# Patient Record
Sex: MALE | Race: WHITE | NOT HISPANIC OR LATINO | URBAN - METROPOLITAN AREA
[De-identification: names, ages, dates, MRNs, and addresses within clinical notes are randomized per-mention and may not be internally consistent; named-entity substitution may affect disease eponyms.]

---

## 2018-09-06 ENCOUNTER — OUTPATIENT (OUTPATIENT)
Dept: OUTPATIENT SERVICES | Facility: HOSPITAL | Age: 73
LOS: 1 days | Discharge: HOME | End: 2018-09-06

## 2020-09-29 PROBLEM — Z00.00 ENCOUNTER FOR PREVENTIVE HEALTH EXAMINATION: Status: ACTIVE | Noted: 2020-09-29

## 2020-10-06 ENCOUNTER — APPOINTMENT (OUTPATIENT)
Dept: ORTHOPEDIC SURGERY | Facility: CLINIC | Age: 75
End: 2020-10-06
Payer: MEDICARE

## 2020-10-06 VITALS
HEIGHT: 66 IN | BODY MASS INDEX: 24.27 KG/M2 | DIASTOLIC BLOOD PRESSURE: 92 MMHG | SYSTOLIC BLOOD PRESSURE: 162 MMHG | HEART RATE: 80 BPM | WEIGHT: 151 LBS

## 2020-10-06 DIAGNOSIS — Z78.9 OTHER SPECIFIED HEALTH STATUS: ICD-10-CM

## 2020-10-06 DIAGNOSIS — M67.911 UNSPECIFIED DISORDER OF SYNOVIUM AND TENDON, RIGHT SHOULDER: ICD-10-CM

## 2020-10-06 DIAGNOSIS — M25.561 PAIN IN RIGHT KNEE: ICD-10-CM

## 2020-10-06 PROCEDURE — 73030 X-RAY EXAM OF SHOULDER: CPT | Mod: RT

## 2020-10-06 PROCEDURE — 99204 OFFICE O/P NEW MOD 45 MIN: CPT

## 2020-10-06 PROCEDURE — 73564 X-RAY EXAM KNEE 4 OR MORE: CPT | Mod: 50

## 2020-10-06 NOTE — CONSULT LETTER
[Dear  ___] : Dear  [unfilled], [Consult Letter:] : I had the pleasure of evaluating your patient, [unfilled]. [Please see my note below.] : Please see my note below. [Consult Closing:] : Thank you very much for allowing me to participate in the care of this patient.  If you have any questions, please do not hesitate to contact me. [Sincerely,] : Sincerely, [FreeTextEntry2] : Cecil Mendez MD [FreeTextEntry3] : Naa Lora MD\par Orthopedic Surgery\par Sports Medicine\par

## 2020-10-06 NOTE — PHYSICAL EXAM
[LE] : Sensory: Intact in bilateral lower extremities [DP] : dorsalis pedis 2+ and symmetric bilaterally [PT] : posterior tibial 2+ and symmetric bilaterally [Normal RLE] : Right Lower Extremity: No scars, rashes, lesions, ulcers, skin intact [Normal LLE] : Left Lower Extremity: No scars, rashes, lesions, ulcers, skin intact [Normal Touch] : sensation intact for touch [Normal] : Gait: normal [de-identified] : RIGHT shoulder with LEFT for comparison\par Cervical spine is With mild stiffness but no pain\par Normal appearance. No edema, ecchymoses, erythema, deformity\par AROM: 170 FE, IR to T 9 equal to the lEFT but with mild pain on the RIGHT, 160-170 abd with mild pain on the RIGHT shoulder\par PROM: 170 FE, 70 ER at the side, 90 ER and  IR in the 90 degree abducted position.\par Motor:  5/5  supraspinatus,  5/5 ER, 5/5 IR, 5/5 biceps, 5/5 deltoid.  Normal lift off test\par RIGHT shoulder is with mildly positive Neer and Perales. -  X-arm.  \par nontender a.c. joint and nontender over the biceps tendon and the anterior shoulder.  \par Laxity: normal. \par No scapular winging.\par Sensation is intact distally in the UE.\par Skin is intact in the UE. \par Intact Motor distally.\par \par Knees:\par Nonantalgic gait. \par Normal alignment\par no effusion.\par - erythema, edema, warmth.\par Tender mildly patellar facets RIGHT knee but not LEFT knee.\par ROM: 0 degrees extension to 130 degrees flexion with mild pain RIGHT knee on full flexion but no pain on the LEFT. No crepitus\par - Larry.\par 1A Lachman.  - Pivot shift. - posterior drawer. Normal rotational, varus/valgus laxity.\par Intact extensor mechanism.\par NVI distally.\par  [de-identified] : No respiratory distress or cough [de-identified] : \par x-rays of bilateral knees WB 4 views today show mild lateral joint space narrowing on the flexed views but otherwise is in no significant joint space narrowing, osteophytes or other signs of osteoarthritis.\par \par X-rays of the RIGHT shoulder show narrowing at the RIGHT a.c.. No elevation of the humeral head or other changes

## 2020-10-06 NOTE — ASSESSMENT
[FreeTextEntry1] : 74-year-old Gentleman with mild RIGHT greater than LEFT knee pain and mild RIGHT shoulder pain and stiffness. 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 In the RIGHT shoulder there is some a.c. joint arthrosis and likely rotator cuff tendinopathy but he has very good strength in the shoulder.\par I gave him home exercises to do for the knees and for his shoulder. Heat and ice. Modify activity to prevent pain If pain is persisting he should temporarily stop walking long distances to allow it to settle down. He could try Voltaren gel or  take some ibuprofen or Tylenol.\par If pain is persisting he should come in for further evaluation or we may try an injection.

## 2020-10-06 NOTE — HISTORY OF PRESENT ILLNESS
[de-identified] : Mr. Mott is a 75 yo RHD gentleman who presents for Right > Left knee pain and mild intermittent RIGHT shoulder pain.\par He retired 4  yrs ago and walks 2-8 miles/ day.\par He walked in the snow 8-9 miles in Jan and knees were fine. \par After he had some thigh pain.\par In March- August he walked his usual 2-8 miles.\par One day in Aug he started having pain and limping intermittently usually after sitting and not when doing his walks. Occasionally when walking he'll feel a little bit of anterior knee pain more on the RIGHT than LEFT\par In the past mo he has soreness  left > right knee.\par He gets intermittent shooting pains in various locations usually at night.\par Knee pain is 2/10 at the worse right and 0.2/10 right.\par No swelling, locking or buckling.\par \par His right shoulder is stiff and painful reaching overhead for 3-4 mos.\par He had injured a RC probably 3-4 yrs ago which got better with PT and time. Pain in the shoulder only 1/10 with movement.\par He never takes anything for pain.

## 2022-03-14 ENCOUNTER — EMERGENCY (EMERGENCY)
Facility: HOSPITAL | Age: 77
LOS: 1 days | Discharge: HOME | End: 2022-03-14
Attending: EMERGENCY MEDICINE | Admitting: EMERGENCY MEDICINE
Payer: MEDICARE

## 2022-03-14 VITALS
TEMPERATURE: 98 F | SYSTOLIC BLOOD PRESSURE: 146 MMHG | RESPIRATION RATE: 18 BRPM | DIASTOLIC BLOOD PRESSURE: 78 MMHG | OXYGEN SATURATION: 96 % | HEART RATE: 89 BPM

## 2022-03-14 VITALS
OXYGEN SATURATION: 96 % | TEMPERATURE: 98 F | DIASTOLIC BLOOD PRESSURE: 95 MMHG | SYSTOLIC BLOOD PRESSURE: 165 MMHG | HEART RATE: 108 BPM | RESPIRATION RATE: 18 BRPM

## 2022-03-14 DIAGNOSIS — Z98.890 OTHER SPECIFIED POSTPROCEDURAL STATES: Chronic | ICD-10-CM

## 2022-03-14 DIAGNOSIS — R33.8 OTHER RETENTION OF URINE: ICD-10-CM

## 2022-03-14 DIAGNOSIS — N40.1 BENIGN PROSTATIC HYPERPLASIA WITH LOWER URINARY TRACT SYMPTOMS: ICD-10-CM

## 2022-03-14 LAB
ALBUMIN SERPL ELPH-MCNC: 3.8 G/DL — SIGNIFICANT CHANGE UP (ref 3.3–5)
ALP SERPL-CCNC: 82 U/L — SIGNIFICANT CHANGE UP (ref 40–120)
ALT FLD-CCNC: 17 U/L — SIGNIFICANT CHANGE UP (ref 10–45)
ANION GAP SERPL CALC-SCNC: 11 MMOL/L — SIGNIFICANT CHANGE UP (ref 5–17)
APPEARANCE UR: CLEAR — SIGNIFICANT CHANGE UP
APTT BLD: 28.1 SEC — SIGNIFICANT CHANGE UP (ref 27.5–35.5)
AST SERPL-CCNC: 22 U/L — SIGNIFICANT CHANGE UP (ref 10–40)
BACTERIA # UR AUTO: PRESENT /HPF
BASOPHILS # BLD AUTO: 0.02 K/UL — SIGNIFICANT CHANGE UP (ref 0–0.2)
BASOPHILS NFR BLD AUTO: 0.2 % — SIGNIFICANT CHANGE UP (ref 0–2)
BILIRUB SERPL-MCNC: 0.5 MG/DL — SIGNIFICANT CHANGE UP (ref 0.2–1.2)
BILIRUB UR-MCNC: NEGATIVE — SIGNIFICANT CHANGE UP
BUN SERPL-MCNC: 10 MG/DL — SIGNIFICANT CHANGE UP (ref 7–23)
CALCIUM SERPL-MCNC: 9 MG/DL — SIGNIFICANT CHANGE UP (ref 8.4–10.5)
CHLORIDE SERPL-SCNC: 101 MMOL/L — SIGNIFICANT CHANGE UP (ref 96–108)
CO2 SERPL-SCNC: 27 MMOL/L — SIGNIFICANT CHANGE UP (ref 22–31)
COLOR SPEC: YELLOW — SIGNIFICANT CHANGE UP
CREAT SERPL-MCNC: 1.06 MG/DL — SIGNIFICANT CHANGE UP (ref 0.5–1.3)
DIFF PNL FLD: ABNORMAL
EGFR: 73 ML/MIN/1.73M2 — SIGNIFICANT CHANGE UP
EOSINOPHIL # BLD AUTO: 0.02 K/UL — SIGNIFICANT CHANGE UP (ref 0–0.5)
EOSINOPHIL NFR BLD AUTO: 0.2 % — SIGNIFICANT CHANGE UP (ref 0–6)
EPI CELLS # UR: SIGNIFICANT CHANGE UP /HPF (ref 0–5)
GLUCOSE SERPL-MCNC: 127 MG/DL — HIGH (ref 70–99)
GLUCOSE UR QL: NEGATIVE — SIGNIFICANT CHANGE UP
HCT VFR BLD CALC: 39.1 % — SIGNIFICANT CHANGE UP (ref 39–50)
HGB BLD-MCNC: 13.1 G/DL — SIGNIFICANT CHANGE UP (ref 13–17)
IMM GRANULOCYTES NFR BLD AUTO: 0.3 % — SIGNIFICANT CHANGE UP (ref 0–1.5)
INR BLD: 1.18 — HIGH (ref 0.88–1.16)
KETONES UR-MCNC: NEGATIVE — SIGNIFICANT CHANGE UP
LEUKOCYTE ESTERASE UR-ACNC: ABNORMAL
LYMPHOCYTES # BLD AUTO: 0.71 K/UL — LOW (ref 1–3.3)
LYMPHOCYTES # BLD AUTO: 7.5 % — LOW (ref 13–44)
MCHC RBC-ENTMCNC: 30 PG — SIGNIFICANT CHANGE UP (ref 27–34)
MCHC RBC-ENTMCNC: 33.5 GM/DL — SIGNIFICANT CHANGE UP (ref 32–36)
MCV RBC AUTO: 89.5 FL — SIGNIFICANT CHANGE UP (ref 80–100)
MONOCYTES # BLD AUTO: 0.68 K/UL — SIGNIFICANT CHANGE UP (ref 0–0.9)
MONOCYTES NFR BLD AUTO: 7.2 % — SIGNIFICANT CHANGE UP (ref 2–14)
NEUTROPHILS # BLD AUTO: 7.98 K/UL — HIGH (ref 1.8–7.4)
NEUTROPHILS NFR BLD AUTO: 84.6 % — HIGH (ref 43–77)
NITRITE UR-MCNC: POSITIVE
NRBC # BLD: 0 /100 WBCS — SIGNIFICANT CHANGE UP (ref 0–0)
PH UR: 5.5 — SIGNIFICANT CHANGE UP (ref 5–8)
PLATELET # BLD AUTO: 227 K/UL — SIGNIFICANT CHANGE UP (ref 150–400)
POTASSIUM SERPL-MCNC: 4.4 MMOL/L — SIGNIFICANT CHANGE UP (ref 3.5–5.3)
POTASSIUM SERPL-SCNC: 4.4 MMOL/L — SIGNIFICANT CHANGE UP (ref 3.5–5.3)
PROT SERPL-MCNC: 6.6 G/DL — SIGNIFICANT CHANGE UP (ref 6–8.3)
PROT UR-MCNC: NEGATIVE MG/DL — SIGNIFICANT CHANGE UP
PROTHROM AB SERPL-ACNC: 14.1 SEC — HIGH (ref 10.5–13.4)
RBC # BLD: 4.37 M/UL — SIGNIFICANT CHANGE UP (ref 4.2–5.8)
RBC # FLD: 12.5 % — SIGNIFICANT CHANGE UP (ref 10.3–14.5)
RBC CASTS # UR COMP ASSIST: < 5 /HPF — SIGNIFICANT CHANGE UP
SODIUM SERPL-SCNC: 139 MMOL/L — SIGNIFICANT CHANGE UP (ref 135–145)
SP GR SPEC: 1.01 — SIGNIFICANT CHANGE UP (ref 1–1.03)
UROBILINOGEN FLD QL: 0.2 E.U./DL — SIGNIFICANT CHANGE UP
WBC # BLD: 9.44 K/UL — SIGNIFICANT CHANGE UP (ref 3.8–10.5)
WBC # FLD AUTO: 9.44 K/UL — SIGNIFICANT CHANGE UP (ref 3.8–10.5)
WBC UR QL: ABNORMAL /HPF

## 2022-03-14 PROCEDURE — 87186 SC STD MICRODIL/AGAR DIL: CPT

## 2022-03-14 PROCEDURE — 87184 SC STD DISK METHOD PER PLATE: CPT

## 2022-03-14 PROCEDURE — 85610 PROTHROMBIN TIME: CPT

## 2022-03-14 PROCEDURE — 81001 URINALYSIS AUTO W/SCOPE: CPT

## 2022-03-14 PROCEDURE — 85730 THROMBOPLASTIN TIME PARTIAL: CPT

## 2022-03-14 PROCEDURE — 99283 EMERGENCY DEPT VISIT LOW MDM: CPT

## 2022-03-14 PROCEDURE — 36415 COLL VENOUS BLD VENIPUNCTURE: CPT

## 2022-03-14 PROCEDURE — 36000 PLACE NEEDLE IN VEIN: CPT

## 2022-03-14 PROCEDURE — 51702 INSERT TEMP BLADDER CATH: CPT

## 2022-03-14 PROCEDURE — 99284 EMERGENCY DEPT VISIT MOD MDM: CPT

## 2022-03-14 PROCEDURE — 80053 COMPREHEN METABOLIC PANEL: CPT

## 2022-03-14 PROCEDURE — 85025 COMPLETE CBC W/AUTO DIFF WBC: CPT

## 2022-03-14 PROCEDURE — 87086 URINE CULTURE/COLONY COUNT: CPT

## 2022-03-14 RX ORDER — SODIUM CHLORIDE 9 MG/ML
1000 INJECTION INTRAMUSCULAR; INTRAVENOUS; SUBCUTANEOUS ONCE
Refills: 0 | Status: COMPLETED | OUTPATIENT
Start: 2022-03-14 | End: 2022-03-14

## 2022-03-14 RX ORDER — CEFPODOXIME PROXETIL 100 MG
200 TABLET ORAL ONCE
Refills: 0 | Status: COMPLETED | OUTPATIENT
Start: 2022-03-14 | End: 2022-03-14

## 2022-03-14 RX ADMIN — SODIUM CHLORIDE 1000 MILLILITER(S): 9 INJECTION INTRAMUSCULAR; INTRAVENOUS; SUBCUTANEOUS at 17:32

## 2022-03-14 RX ADMIN — Medication 200 MILLIGRAM(S): at 19:15

## 2022-03-14 NOTE — ED PROVIDER NOTE - NSFOLLOWUPINSTRUCTIONS_ED_ALL_ED_FT
follow up with your Urologist in 3 days.       Urinary Retention in Men    WHAT YOU NEED TO KNOW:    What is urinary retention? Urinary retention is a condition that develops when your bladder does not empty completely when you urinate.    Male Reproductive System         What causes urinary retention?   •An enlarged prostate      •Blockages, such as a stone, growth, or narrowing of your urethra      •A weak bladder muscle      •Nerve damage from diabetes, stroke, or spinal cord injury      •Bladder diverticula, which are pockets of urine that form in your bladder and do not empty      •Certain medicines, such as narcotics, antihistamines, or antidepressants      What are the signs and symptoms of urinary retention?   •Frequent urination, or the urge to urinate right after you finish      •An urge to urinate, but your urine does not come out or dribbles out slowly and weakly      •Frequent urine leaks that happen during the day or while you sleep      •Pain or pressure when you urinate      •Pain or stiffness in your abdomen, lower back, hips, or upper thighs      •Blood in your urine      How is urinary retention diagnosed? Your healthcare provider will ask about your health history and the medicines you take. He will press or tap on your lower abdomen. You may need any of the following tests:   •A digital rectal exam is when healthcare providers carefully feel the size of your prostate.      •A post void residual test will show how much urine is left in your bladder after you urinate. You will be asked to urinate and then healthcare providers will use a small ultrasound machine to check how much urine is left in your bladder.      •Blood or urine tests may show infection or prostate specific antigen (PSA) levels. PSA may be elevated in prostate cancer.      •An ultrasound uses sound waves to show pictures on a monitor. An ultrasound may be done to show bladder stones, infection, or other problems.      •A CT scan, or CAT scan, is a type of x-ray that is taken of your prostate, kidneys, and bladder. The pictures may show what is causing your urinary retention. You may be given a dye before the pictures are taken to help healthcare providers see the pictures better. Tell the healthcare provider if you have ever had an allergic reaction to contrast dye.      How is urinary retention treated?   •A Zimmer catheter is a tube put into your bladder to drain urine into a bag. Keep the bag below your waist. This will prevent urine from flowing back into your bladder and causing an infection or other problems. Also, keep the tube free of kinks so the urine will drain properly. Do not pull on the catheter. This can cause pain and bleeding, and may cause the catheter to come out.       •Medicines can help decrease the size of your prostate, fight infection, and help you urinate more easily.      •Surgery may be needed to treat the condition that is causing your urinary retention.       When should I contact my healthcare provider?   •You have a fever.      •You have pain when you urinate.      •You have blood in your urine.      •You have problems with your catheter.      •You have questions or concerns about your condition or care.      When should I seek immediate care or call 911?   •You have severe abdominal pain.      •You are breathing faster than usual.      •Your heartbeat is faster than usual.      •Your face, hands, feet, or ankles are swollen.       CARE AGREEMENT:    You have the right to help plan your care. Learn about your health condition and how it may be treated. Discuss treatment options with your healthcare providers to decide what care you want to receive. You always have the right to refuse treatment.

## 2022-03-14 NOTE — ED PROVIDER NOTE - OBJECTIVE STATEMENT
76y Male PMH of cervical spinal stenosis s/p spine surgery on Thursday p/w acute urinary retention that began last night. He denies bowel incontinence, numbness, weakness, tingling or sensory changes in any of the extremities.

## 2022-03-14 NOTE — ED ADULT NURSE NOTE - OBJECTIVE STATEMENT
76yr/male presents to ED with c/o urinary retention. Patient reports last time he urinated was at around 8pm last night and states "it wasn't much." Patient reports hx of spinal surgery last Thursday. Denies fever. Patient in C-collar on arrival.

## 2022-03-14 NOTE — ED PROVIDER NOTE - PATIENT PORTAL LINK FT
You can access the FollowMyHealth Patient Portal offered by [facility] by registering at the following website: [email or web address]. By joining [employer]’s FollowMyHealth portal, you will also be able to view your health information using other applications (apps) compatible with our system.

## 2022-03-14 NOTE — ED PROVIDER NOTE - CLINICAL SUMMARY MEDICAL DECISION MAKING FREE TEXT BOX
76y Male PMH of cervical spinal stenosis s/p spine surgery on Thursday p/w acute urinary retention. Zimmer placed patient put out 1L< of clear, light-yellow urine. Acute urinary retention most likely 2/2 76y Male PMH of cervical spinal stenosis s/p spine surgery on Thursday p/w acute urinary retention. Zimmer placed patient put out 1L< of clear, light-yellow urine. Acute urinary retention most likely 2/2 tramadol. Patient instructed to f/u with PCP and surgeon. 76y Male PMH of cervical spinal stenosis s/p spine surgery on Thursday p/w acute urinary retention. Almazan placed patient put out 1L< of clear, light-yellow urine. Acute urinary retention most likely 2/2 tramadol. Patient instructed to f/u with PCP and urologist for almazan removal.

## 2022-03-14 NOTE — ED ADULT NURSE REASSESSMENT NOTE - NS ED NURSE REASSESS COMMENT FT1
leg bag placed on patient. extensive education completed with patient and wife on leg bag care. demonstrated and verbalized understanding. ambulatory w walker independently to bathroom.

## 2022-03-14 NOTE — ED PROVIDER NOTE - CONSTITUTIONAL, MLM
Lying in bed wearing cervical color, well appearing, awake, alert, oriented to person, place, time/situation and in no apparent distress. normal...

## 2022-03-14 NOTE — ED PROVIDER NOTE - ATTENDING CONTRIBUTION TO CARE
+ retention after recent surgery, relieved 1 L w almazan ,   BPH / recent constipation and possibly med effect from tramadol / recent anesthesia contributing,   L ok , UA possible UTI , will give antibiotics, stable for d/c   urology f/u   discussed emergent return instructions

## 2022-03-15 RX ORDER — AZTREONAM 2 G
1 VIAL (EA) INJECTION
Qty: 14 | Refills: 0
Start: 2022-03-15 | End: 2022-03-21

## 2022-03-16 ENCOUNTER — TRANSCRIPTION ENCOUNTER (OUTPATIENT)
Age: 77
End: 2022-03-16

## 2022-03-16 ENCOUNTER — NON-APPOINTMENT (OUTPATIENT)
Age: 77
End: 2022-03-16

## 2022-03-16 DIAGNOSIS — N39.0 URINARY TRACT INFECTION, SITE NOT SPECIFIED: ICD-10-CM

## 2022-03-16 DIAGNOSIS — R33.9 RETENTION OF URINE, UNSPECIFIED: ICD-10-CM

## 2022-03-16 LAB
-  AZTREONAM: SIGNIFICANT CHANGE UP
-  CEFEPIME: SIGNIFICANT CHANGE UP
-  CIPROFLOXACIN: SIGNIFICANT CHANGE UP
-  CIPROFLOXACIN: SIGNIFICANT CHANGE UP
-  GENTAMICIN: SIGNIFICANT CHANGE UP
-  PIPERACILLIN/TAZOBACTAM: SIGNIFICANT CHANGE UP
-  TOBRAMYCIN: SIGNIFICANT CHANGE UP
CULTURE RESULTS: SIGNIFICANT CHANGE UP
METHOD TYPE: SIGNIFICANT CHANGE UP
METHOD TYPE: SIGNIFICANT CHANGE UP
ORGANISM # SPEC MICROSCOPIC CNT: SIGNIFICANT CHANGE UP
SPECIMEN SOURCE: SIGNIFICANT CHANGE UP

## 2022-03-17 ENCOUNTER — APPOINTMENT (OUTPATIENT)
Dept: UROLOGY | Facility: CLINIC | Age: 77
End: 2022-03-17
Payer: MEDICARE

## 2022-03-17 ENCOUNTER — NON-APPOINTMENT (OUTPATIENT)
Age: 77
End: 2022-03-17

## 2022-03-17 VITALS
DIASTOLIC BLOOD PRESSURE: 82 MMHG | BODY MASS INDEX: 24.43 KG/M2 | HEART RATE: 98 BPM | HEIGHT: 66 IN | SYSTOLIC BLOOD PRESSURE: 138 MMHG | WEIGHT: 152 LBS | TEMPERATURE: 97.9 F

## 2022-03-17 DIAGNOSIS — Z82.49 FAMILY HISTORY OF ISCHEMIC HEART DISEASE AND OTHER DISEASES OF THE CIRCULATORY SYSTEM: ICD-10-CM

## 2022-03-17 DIAGNOSIS — E78.5 HYPERLIPIDEMIA, UNSPECIFIED: ICD-10-CM

## 2022-03-17 DIAGNOSIS — M54.50 LOW BACK PAIN, UNSPECIFIED: ICD-10-CM

## 2022-03-17 DIAGNOSIS — G89.29 LOW BACK PAIN, UNSPECIFIED: ICD-10-CM

## 2022-03-17 DIAGNOSIS — Z83.79 FAMILY HISTORY OF OTHER DISEASES OF THE DIGESTIVE SYSTEM: ICD-10-CM

## 2022-03-17 LAB
BILIRUB UR QL STRIP: NORMAL
CLARITY UR: CLEAR
GLUCOSE UR-MCNC: NORMAL
HCG UR QL: 1 EU/DL
HGB UR QL STRIP.AUTO: NORMAL
KETONES UR-MCNC: NORMAL
LEUKOCYTE ESTERASE UR QL STRIP: NORMAL
NITRITE UR QL STRIP: NORMAL
PH UR STRIP: 7
PROT UR STRIP-MCNC: 100
SP GR UR STRIP: 1.02

## 2022-03-17 PROCEDURE — 76857 US EXAM PELVIC LIMITED: CPT

## 2022-03-17 PROCEDURE — 81003 URINALYSIS AUTO W/O SCOPE: CPT | Mod: QW

## 2022-03-17 PROCEDURE — 54450 PREPUTIAL STRETCHING: CPT

## 2022-03-17 PROCEDURE — 99205 OFFICE O/P NEW HI 60 MIN: CPT

## 2022-03-17 RX ORDER — SENNOSIDES 8.6 MG TABLETS 8.6 MG/1
8.6 TABLET ORAL
Refills: 0 | Status: ACTIVE | COMMUNITY

## 2022-03-17 RX ORDER — ROSUVASTATIN CALCIUM 5 MG/1
TABLET, FILM COATED ORAL
Refills: 0 | Status: ACTIVE | COMMUNITY

## 2022-03-17 RX ORDER — TRAMADOL HYDROCHLORIDE 25 MG/1
TABLET, COATED ORAL
Refills: 0 | Status: ACTIVE | COMMUNITY

## 2022-03-18 PROBLEM — M48.00 SPINAL STENOSIS, SITE UNSPECIFIED: Chronic | Status: ACTIVE | Noted: 2022-03-14

## 2022-03-20 LAB — BACTERIA UR CULT: NORMAL

## 2022-03-21 NOTE — PHYSICAL EXAM
[General Appearance - Well Developed] : well developed [General Appearance - Well Nourished] : well nourished [Well Groomed] : well groomed [Normal Appearance] : normal appearance [General Appearance - In No Acute Distress] : no acute distress [Edema] : no peripheral edema [Respiration, Rhythm And Depth] : normal respiratory rhythm and effort [Exaggerated Use Of Accessory Muscles For Inspiration] : no accessory muscle use [Abdomen Soft] : soft [Abdomen Tenderness] : non-tender [Costovertebral Angle Tenderness] : no ~M costovertebral angle tenderness [Urethral Meatus] : meatus normal [Urinary Bladder Findings] : the bladder was normal on palpation [Scrotum] : the scrotum was normal [Testes Mass (___cm)] : there were no testicular masses [No Prostate Nodules] : no prostate nodules [Normal Station and Gait] : the gait and station were normal for the patient's age [] : no rash [No Focal Deficits] : no focal deficits [Oriented To Time, Place, And Person] : oriented to person, place, and time [Affect] : the affect was normal [Mood] : the mood was normal [Not Anxious] : not anxious [No Palpable Adenopathy] : no palpable adenopathy [Heart Rate And Rhythm] : Heart rate and rhythm were normal [Abdomen Mass (___ Cm)] : no abdominal mass palpated [Abdomen Hernia] : no hernia was discovered [Penis Abnormality] : normal uncircumcised penis [Epididymis] : the epididymides were normal [Testes Tenderness] : no tenderness of the testes [Skin Color & Pigmentation] : normal skin color and pigmentation [Prostate Tenderness] : the prostate was not tender [FreeTextEntry1] : Paraphimosis with discoloration of the glans suggesting that this is somewhat longstanding.

## 2022-03-21 NOTE — LETTER BODY
[Dear  ___] : Dear  [unfilled], [Consult Letter:] : I had the pleasure of evaluating your patient, [unfilled]. [Please see my note below.] : Please see my note below. [Consult Closing:] : Thank you very much for allowing me to participate in the care of this patient.  If you have any questions, please do not hesitate to contact me. [Sincerely,] : Sincerely, [FreeTextEntry3] : Rhys Casper MD, FACS

## 2022-03-21 NOTE — ASSESSMENT
[FreeTextEntry1] : I discussed the findings and options with MrYecenia and Mrs. MIO MOTT in detail. Fortunately, he urinated on several occasions.  We agreed that he would continue with Flomax increasing the dose to bid for at least 1-2 weeks.  He can then decrease this to qd dosing as he sees fit.\par \par The paraphimosis was reduced with manual compression and I stressed the importance of maintaining the foreskin over the glans penis.\par \par I would like to see Mr. Mott, electively, in 8 weeks (sono).  If the retention recurs he understands that he should either go to the ER promptly, or call us. \par \par Addendum: \par \par Mr. Mott returned to the office several hours later, extremely uncomfortable and unable to urinate. A #16 Fr Zimmer catheter was placed emergently and he was instructed to follow-up with in one week for a repeat voiding trial after increasing the dose of Flomax to bid. \par

## 2022-03-21 NOTE — HISTORY OF PRESENT ILLNESS
[FreeTextEntry1] : Mr. IMO MOTT comes in today for a urologic evaluation.  One week ago, he underwent surgery for spinal cervical spine stenosis and had a catheter placed intraoperatively. He passed a subsequent voiding trial. On March 13, 2022 he went into acute urinary retention, prompting an Emergency Room visit, where a catheter was placed and he was instructed to begin tamsulosin 0.4mg. Additionally, he was prescribed Bactrim DS which he is on.\par \par At baseline, Mr. Mott reports longstanding obstructive and irritative lower urinary tract symptoms, with nocturia x 1-2. \par IPSS: n/a\par Sono: 208cc PVR: 80cc prostate\par \par Mr. Mott has erectile dysfunction, however, he is not sexually active and is not interested in treating this.\par \par His PSAs were followed with Dr. Deyvi Lucas at [facility] (~ 4 years ago).\par \par PSAs: 2/27/20--6.69; 8/8/19--7.61; 2/7/19--7.42; 2/6/17--7.2; 2/1/16--7.05; 2/1/15--5.84; 2/1/14--7.67; 2/1/13--6.4;

## 2022-03-21 NOTE — ADDENDUM
[FreeTextEntry1] : A portion of this note was written by [Martin Moreau] on 03/16/2022 acting as a scribe for Dr. Casper. \par \par I have personally reviewed the chart and agree that the record accurately reflects my personal performance of the history, physical exam, assessment, and plan.

## 2022-03-24 ENCOUNTER — APPOINTMENT (OUTPATIENT)
Dept: UROLOGY | Facility: CLINIC | Age: 77
End: 2022-03-24
Payer: MEDICARE

## 2022-03-24 VITALS
HEART RATE: 105 BPM | DIASTOLIC BLOOD PRESSURE: 76 MMHG | HEIGHT: 67 IN | BODY MASS INDEX: 23.86 KG/M2 | RESPIRATION RATE: 14 BRPM | SYSTOLIC BLOOD PRESSURE: 122 MMHG | WEIGHT: 152 LBS | TEMPERATURE: 97.8 F

## 2022-03-24 DIAGNOSIS — N52.01 ERECTILE DYSFUNCTION DUE TO ARTERIAL INSUFFICIENCY: ICD-10-CM

## 2022-03-24 DIAGNOSIS — R97.20 ELEVATED PROSTATE, SPECIFIC ANTIGEN [PSA]: ICD-10-CM

## 2022-03-24 DIAGNOSIS — R39.9 UNSPECIFIED SYMPTOMS AND SIGNS INVOLVING THE GENITOURINARY SYSTEM: ICD-10-CM

## 2022-03-24 DIAGNOSIS — R33.9 RETENTION OF URINE, UNSPECIFIED: ICD-10-CM

## 2022-03-24 DIAGNOSIS — N47.2 PARAPHIMOSIS: ICD-10-CM

## 2022-03-24 LAB
BILIRUB UR QL STRIP: NEGATIVE
CLARITY UR: CLEAR
COLLECTION METHOD: NORMAL
GLUCOSE UR-MCNC: NEGATIVE
HCG UR QL: 0.2 EU/DL
HGB UR QL STRIP.AUTO: NORMAL
KETONES UR-MCNC: NEGATIVE
LEUKOCYTE ESTERASE UR QL STRIP: NORMAL
NITRITE UR QL STRIP: NEGATIVE
PH UR STRIP: 6
PROT UR STRIP-MCNC: NORMAL
SP GR UR STRIP: 1.02

## 2022-03-24 PROCEDURE — 99215 OFFICE O/P EST HI 40 MIN: CPT

## 2022-03-24 PROCEDURE — 81003 URINALYSIS AUTO W/O SCOPE: CPT | Mod: QW

## 2022-03-24 PROCEDURE — 51798 US URINE CAPACITY MEASURE: CPT

## 2022-03-24 RX ORDER — TAMSULOSIN HYDROCHLORIDE 0.4 MG/1
0.4 CAPSULE ORAL
Qty: 180 | Refills: 3 | Status: ACTIVE | COMMUNITY
Start: 1900-01-01 | End: 1900-01-01

## 2022-03-24 NOTE — ASSESSMENT
[FreeTextEntry1] : I discussed the findings and options with MrYecenia and . MIO MOTT in detail. Fortunately, he urinated on several occasions today.  We agreed that he would maintain the bid tamsulosin dosing for another month and then consider decreasing this to qd dosing.  \par \par Mr. Mott will get a PSA when he sees Dr. Mendez (no earlier than 6-8 weeks because of the recent catheterization).\par \par I would like to see Mr. Mott, electively, in 6 months (bladder sono).\par

## 2022-03-24 NOTE — PHYSICAL EXAM
[General Appearance - Well Developed] : well developed [Normal Appearance] : normal appearance [General Appearance - Well Nourished] : well nourished [Well Groomed] : well groomed [General Appearance - In No Acute Distress] : no acute distress [Abdomen Soft] : soft [Abdomen Tenderness] : non-tender [Abdomen Mass (___ Cm)] : no abdominal mass palpated [Abdomen Hernia] : no hernia was discovered [Costovertebral Angle Tenderness] : no ~M costovertebral angle tenderness [Urethral Meatus] : meatus normal [Penis Abnormality] : normal uncircumcised penis [Urinary Bladder Findings] : the bladder was normal on palpation [Scrotum] : the scrotum was normal [Epididymis] : the epididymides were normal [Testes Tenderness] : no tenderness of the testes [Testes Mass (___cm)] : there were no testicular masses [Prostate Tenderness] : the prostate was not tender [No Prostate Nodules] : no prostate nodules [Skin Color & Pigmentation] : normal skin color and pigmentation [Heart Rate And Rhythm] : Heart rate and rhythm were normal [Edema] : no peripheral edema [] : no respiratory distress [Respiration, Rhythm And Depth] : normal respiratory rhythm and effort [Exaggerated Use Of Accessory Muscles For Inspiration] : no accessory muscle use [Oriented To Time, Place, And Person] : oriented to person, place, and time [Affect] : the affect was normal [Mood] : the mood was normal [Not Anxious] : not anxious [Normal Station and Gait] : the gait and station were normal for the patient's age [No Focal Deficits] : no focal deficits [No Palpable Adenopathy] : no palpable adenopathy [FreeTextEntry1] : Paraphimosis with discoloration of the glans suggesting that this is somewhat longstanding.

## 2022-03-24 NOTE — ADDENDUM
[FreeTextEntry1] : A portion of this note was written by [Martin Moreau] on 03/21/2022 acting as a scribe for Dr. Casper. \par \par I have personally reviewed the chart and agree that the record accurately reflects my personal performance of the history, physical exam, assessment, and plan.

## 2022-03-24 NOTE — HISTORY OF PRESENT ILLNESS
[FreeTextEntry1] : Mr. MIO MOTT comes in today for a urologic follow-up, including a scheduled voiding trial. \par \par Earlier this month he underwent surgery for spinal cervical spine stenosis and had a catheter placed intraoperatively. He passed a subsequent voiding trial. On March 13, 2022 he went into acute urinary retention, prompting an Emergency Room visit, where a catheter was placed and he was instructed to begin tamsulosin 0.4mg. Additionally, he was prescribed Bactrim DS which he is on.\par \par During his last visit, he seemed to pass a voiding trial, however, he returned several hours later unable to urinate and the catheter was replaced. The tamsulosin was increased to bid.\par \par At baseline, Mr. Mott reports longstanding obstructive and irritative lower urinary tract symptoms, with nocturia x 1-2.\par Sono: 2cc PVR\par \par Mr. Mott has erectile dysfunction, however, he is not sexually active and is not interested in treating this.\par \par His PSAs were followed with Dr. Deyvi Lucas at [facility] (~ 4 years ago).\par \par PSAs: 2/27/20--6.69; 8/8/19--7.61; 2/7/19--7.42; 2/6/17--7.2; 2/1/16--7.05; 2/1/15--5.84; 2/1/14--7.67; 2/1/13--6.4;

## 2022-03-25 ENCOUNTER — RESULT REVIEW (OUTPATIENT)
Age: 77
End: 2022-03-25

## 2022-03-25 ENCOUNTER — INPATIENT (INPATIENT)
Facility: HOSPITAL | Age: 77
LOS: 2 days | Discharge: HOME | DRG: 378 | End: 2022-03-28
Attending: INTERNAL MEDICINE | Admitting: INTERNAL MEDICINE
Payer: MEDICARE

## 2022-03-25 VITALS
WEIGHT: 115.08 LBS | DIASTOLIC BLOOD PRESSURE: 67 MMHG | HEART RATE: 96 BPM | TEMPERATURE: 98 F | RESPIRATION RATE: 18 BRPM | SYSTOLIC BLOOD PRESSURE: 99 MMHG | OXYGEN SATURATION: 100 %

## 2022-03-25 DIAGNOSIS — M48.00 SPINAL STENOSIS, SITE UNSPECIFIED: ICD-10-CM

## 2022-03-25 DIAGNOSIS — Z29.9 ENCOUNTER FOR PROPHYLACTIC MEASURES, UNSPECIFIED: ICD-10-CM

## 2022-03-25 DIAGNOSIS — E78.5 HYPERLIPIDEMIA, UNSPECIFIED: ICD-10-CM

## 2022-03-25 DIAGNOSIS — K92.2 GASTROINTESTINAL HEMORRHAGE, UNSPECIFIED: ICD-10-CM

## 2022-03-25 DIAGNOSIS — Z98.890 OTHER SPECIFIED POSTPROCEDURAL STATES: Chronic | ICD-10-CM

## 2022-03-25 DIAGNOSIS — R33.9 RETENTION OF URINE, UNSPECIFIED: ICD-10-CM

## 2022-03-25 LAB
ALBUMIN SERPL ELPH-MCNC: 3.5 G/DL — SIGNIFICANT CHANGE UP (ref 3.3–5)
ALP SERPL-CCNC: 62 U/L — SIGNIFICANT CHANGE UP (ref 40–120)
ALT FLD-CCNC: 66 U/L — HIGH (ref 10–45)
ANION GAP SERPL CALC-SCNC: 8 MMOL/L — SIGNIFICANT CHANGE UP (ref 5–17)
ANION GAP SERPL CALC-SCNC: 8 MMOL/L — SIGNIFICANT CHANGE UP (ref 5–17)
APTT BLD: 25 SEC — LOW (ref 27.5–35.5)
AST SERPL-CCNC: 44 U/L — HIGH (ref 10–40)
BASOPHILS # BLD AUTO: 0.02 K/UL — SIGNIFICANT CHANGE UP (ref 0–0.2)
BASOPHILS NFR BLD AUTO: 0.2 % — SIGNIFICANT CHANGE UP (ref 0–2)
BILIRUB SERPL-MCNC: 0.2 MG/DL — SIGNIFICANT CHANGE UP (ref 0.2–1.2)
BLD GP AB SCN SERPL QL: NEGATIVE — SIGNIFICANT CHANGE UP
BUN SERPL-MCNC: 34 MG/DL — HIGH (ref 7–23)
BUN SERPL-MCNC: 45 MG/DL — HIGH (ref 7–23)
CALCIUM SERPL-MCNC: 8.6 MG/DL — SIGNIFICANT CHANGE UP (ref 8.4–10.5)
CALCIUM SERPL-MCNC: 8.7 MG/DL — SIGNIFICANT CHANGE UP (ref 8.4–10.5)
CHLORIDE SERPL-SCNC: 108 MMOL/L — SIGNIFICANT CHANGE UP (ref 96–108)
CHLORIDE SERPL-SCNC: 110 MMOL/L — HIGH (ref 96–108)
CO2 SERPL-SCNC: 24 MMOL/L — SIGNIFICANT CHANGE UP (ref 22–31)
CO2 SERPL-SCNC: 25 MMOL/L — SIGNIFICANT CHANGE UP (ref 22–31)
CREAT SERPL-MCNC: 0.9 MG/DL — SIGNIFICANT CHANGE UP (ref 0.5–1.3)
CREAT SERPL-MCNC: 0.95 MG/DL — SIGNIFICANT CHANGE UP (ref 0.5–1.3)
EGFR: 83 ML/MIN/1.73M2 — SIGNIFICANT CHANGE UP
EGFR: 89 ML/MIN/1.73M2 — SIGNIFICANT CHANGE UP
EOSINOPHIL # BLD AUTO: 0.11 K/UL — SIGNIFICANT CHANGE UP (ref 0–0.5)
EOSINOPHIL NFR BLD AUTO: 1 % — SIGNIFICANT CHANGE UP (ref 0–6)
GLUCOSE BLDC GLUCOMTR-MCNC: 141 MG/DL — HIGH (ref 70–99)
GLUCOSE BLDC GLUCOMTR-MCNC: 161 MG/DL — HIGH (ref 70–99)
GLUCOSE SERPL-MCNC: 160 MG/DL — HIGH (ref 70–99)
GLUCOSE SERPL-MCNC: 226 MG/DL — HIGH (ref 70–99)
HCT VFR BLD CALC: 22 % — LOW (ref 39–50)
HCT VFR BLD CALC: 23.3 % — LOW (ref 39–50)
HGB BLD-MCNC: 7.2 G/DL — LOW (ref 13–17)
HGB BLD-MCNC: 7.7 G/DL — LOW (ref 13–17)
IMM GRANULOCYTES NFR BLD AUTO: 0.5 % — SIGNIFICANT CHANGE UP (ref 0–1.5)
INR BLD: 1.17 — HIGH (ref 0.88–1.16)
LYMPHOCYTES # BLD AUTO: 1.24 K/UL — SIGNIFICANT CHANGE UP (ref 1–3.3)
LYMPHOCYTES # BLD AUTO: 11.4 % — LOW (ref 13–44)
MCHC RBC-ENTMCNC: 29.6 PG — SIGNIFICANT CHANGE UP (ref 27–34)
MCHC RBC-ENTMCNC: 30.6 PG — SIGNIFICANT CHANGE UP (ref 27–34)
MCHC RBC-ENTMCNC: 32.7 GM/DL — SIGNIFICANT CHANGE UP (ref 32–36)
MCHC RBC-ENTMCNC: 33 GM/DL — SIGNIFICANT CHANGE UP (ref 32–36)
MCV RBC AUTO: 89.6 FL — SIGNIFICANT CHANGE UP (ref 80–100)
MCV RBC AUTO: 93.6 FL — SIGNIFICANT CHANGE UP (ref 80–100)
MONOCYTES # BLD AUTO: 0.44 K/UL — SIGNIFICANT CHANGE UP (ref 0–0.9)
MONOCYTES NFR BLD AUTO: 4 % — SIGNIFICANT CHANGE UP (ref 2–14)
NEUTROPHILS # BLD AUTO: 9.03 K/UL — HIGH (ref 1.8–7.4)
NEUTROPHILS NFR BLD AUTO: 82.9 % — HIGH (ref 43–77)
NRBC # BLD: 0 /100 WBCS — SIGNIFICANT CHANGE UP (ref 0–0)
NRBC # BLD: 0 /100 WBCS — SIGNIFICANT CHANGE UP (ref 0–0)
PLATELET # BLD AUTO: 302 K/UL — SIGNIFICANT CHANGE UP (ref 150–400)
PLATELET # BLD AUTO: 333 K/UL — SIGNIFICANT CHANGE UP (ref 150–400)
POTASSIUM SERPL-MCNC: 3.9 MMOL/L — SIGNIFICANT CHANGE UP (ref 3.5–5.3)
POTASSIUM SERPL-MCNC: 4.3 MMOL/L — SIGNIFICANT CHANGE UP (ref 3.5–5.3)
POTASSIUM SERPL-SCNC: 3.9 MMOL/L — SIGNIFICANT CHANGE UP (ref 3.5–5.3)
POTASSIUM SERPL-SCNC: 4.3 MMOL/L — SIGNIFICANT CHANGE UP (ref 3.5–5.3)
PROT SERPL-MCNC: 5.5 G/DL — LOW (ref 6–8.3)
PROTHROM AB SERPL-ACNC: 14 SEC — HIGH (ref 10.5–13.4)
RBC # BLD: 2.35 M/UL — LOW (ref 4.2–5.8)
RBC # BLD: 2.6 M/UL — LOW (ref 4.2–5.8)
RBC # FLD: 13.4 % — SIGNIFICANT CHANGE UP (ref 10.3–14.5)
RBC # FLD: 14.8 % — HIGH (ref 10.3–14.5)
RH IG SCN BLD-IMP: POSITIVE — SIGNIFICANT CHANGE UP
RH IG SCN BLD-IMP: POSITIVE — SIGNIFICANT CHANGE UP
SARS-COV-2 RNA SPEC QL NAA+PROBE: NEGATIVE — SIGNIFICANT CHANGE UP
SODIUM SERPL-SCNC: 141 MMOL/L — SIGNIFICANT CHANGE UP (ref 135–145)
SODIUM SERPL-SCNC: 142 MMOL/L — SIGNIFICANT CHANGE UP (ref 135–145)
WBC # BLD: 10.31 K/UL — SIGNIFICANT CHANGE UP (ref 3.8–10.5)
WBC # BLD: 10.89 K/UL — HIGH (ref 3.8–10.5)
WBC # FLD AUTO: 10.31 K/UL — SIGNIFICANT CHANGE UP (ref 3.8–10.5)
WBC # FLD AUTO: 10.89 K/UL — HIGH (ref 3.8–10.5)

## 2022-03-25 PROCEDURE — 93010 ELECTROCARDIOGRAM REPORT: CPT

## 2022-03-25 PROCEDURE — 88305 TISSUE EXAM BY PATHOLOGIST: CPT | Mod: 26

## 2022-03-25 PROCEDURE — 99291 CRITICAL CARE FIRST HOUR: CPT

## 2022-03-25 PROCEDURE — 99222 1ST HOSP IP/OBS MODERATE 55: CPT | Mod: GC

## 2022-03-25 RX ORDER — DEXTROSE 50 % IN WATER 50 %
25 SYRINGE (ML) INTRAVENOUS ONCE
Refills: 0 | Status: DISCONTINUED | OUTPATIENT
Start: 2022-03-25 | End: 2022-03-28

## 2022-03-25 RX ORDER — ATORVASTATIN CALCIUM 80 MG/1
40 TABLET, FILM COATED ORAL AT BEDTIME
Refills: 0 | Status: DISCONTINUED | OUTPATIENT
Start: 2022-03-25 | End: 2022-03-28

## 2022-03-25 RX ORDER — TAMSULOSIN HYDROCHLORIDE 0.4 MG/1
1 CAPSULE ORAL
Qty: 0 | Refills: 0 | DISCHARGE

## 2022-03-25 RX ORDER — TAMSULOSIN HYDROCHLORIDE 0.4 MG/1
0.4 CAPSULE ORAL EVERY 12 HOURS
Refills: 0 | Status: DISCONTINUED | OUTPATIENT
Start: 2022-03-25 | End: 2022-03-28

## 2022-03-25 RX ORDER — POLYETHYLENE GLYCOL 3350 17 G/17G
0 POWDER, FOR SOLUTION ORAL
Qty: 0 | Refills: 0 | DISCHARGE

## 2022-03-25 RX ORDER — POTASSIUM CHLORIDE 20 MEQ
40 PACKET (EA) ORAL ONCE
Refills: 0 | Status: COMPLETED | OUTPATIENT
Start: 2022-03-25 | End: 2022-03-25

## 2022-03-25 RX ORDER — SODIUM CHLORIDE 9 MG/ML
1000 INJECTION, SOLUTION INTRAVENOUS
Refills: 0 | Status: DISCONTINUED | OUTPATIENT
Start: 2022-03-25 | End: 2022-03-28

## 2022-03-25 RX ORDER — SODIUM CHLORIDE 9 MG/ML
1000 INJECTION INTRAMUSCULAR; INTRAVENOUS; SUBCUTANEOUS ONCE
Refills: 0 | Status: COMPLETED | OUTPATIENT
Start: 2022-03-25 | End: 2022-03-25

## 2022-03-25 RX ORDER — PANTOPRAZOLE SODIUM 20 MG/1
40 TABLET, DELAYED RELEASE ORAL EVERY 12 HOURS
Refills: 0 | Status: DISCONTINUED | OUTPATIENT
Start: 2022-03-25 | End: 2022-03-27

## 2022-03-25 RX ORDER — GLUCAGON INJECTION, SOLUTION 0.5 MG/.1ML
1 INJECTION, SOLUTION SUBCUTANEOUS ONCE
Refills: 0 | Status: DISCONTINUED | OUTPATIENT
Start: 2022-03-25 | End: 2022-03-28

## 2022-03-25 RX ORDER — TRAMADOL HYDROCHLORIDE 50 MG/1
1 TABLET ORAL
Qty: 0 | Refills: 0 | DISCHARGE

## 2022-03-25 RX ORDER — PANTOPRAZOLE SODIUM 20 MG/1
8 TABLET, DELAYED RELEASE ORAL
Qty: 80 | Refills: 0 | Status: DISCONTINUED | OUTPATIENT
Start: 2022-03-25 | End: 2022-03-25

## 2022-03-25 RX ORDER — INSULIN LISPRO 100/ML
VIAL (ML) SUBCUTANEOUS
Refills: 0 | Status: DISCONTINUED | OUTPATIENT
Start: 2022-03-25 | End: 2022-03-28

## 2022-03-25 RX ORDER — ROSUVASTATIN CALCIUM 5 MG/1
1 TABLET ORAL
Qty: 0 | Refills: 0 | DISCHARGE

## 2022-03-25 RX ORDER — METHOCARBAMOL 500 MG/1
0 TABLET, FILM COATED ORAL
Qty: 0 | Refills: 0 | DISCHARGE

## 2022-03-25 RX ORDER — DEXTROSE 50 % IN WATER 50 %
15 SYRINGE (ML) INTRAVENOUS ONCE
Refills: 0 | Status: DISCONTINUED | OUTPATIENT
Start: 2022-03-25 | End: 2022-03-28

## 2022-03-25 RX ORDER — DEXTROSE 50 % IN WATER 50 %
12.5 SYRINGE (ML) INTRAVENOUS ONCE
Refills: 0 | Status: DISCONTINUED | OUTPATIENT
Start: 2022-03-25 | End: 2022-03-28

## 2022-03-25 RX ORDER — PANTOPRAZOLE SODIUM 20 MG/1
80 TABLET, DELAYED RELEASE ORAL ONCE
Refills: 0 | Status: COMPLETED | OUTPATIENT
Start: 2022-03-25 | End: 2022-03-25

## 2022-03-25 RX ORDER — ACETAMINOPHEN 500 MG
2 TABLET ORAL
Qty: 0 | Refills: 0 | DISCHARGE

## 2022-03-25 RX ADMIN — PANTOPRAZOLE SODIUM 10 MG/HR: 20 TABLET, DELAYED RELEASE ORAL at 11:50

## 2022-03-25 RX ADMIN — PANTOPRAZOLE SODIUM 80 MILLIGRAM(S): 20 TABLET, DELAYED RELEASE ORAL at 11:24

## 2022-03-25 RX ADMIN — TAMSULOSIN HYDROCHLORIDE 0.4 MILLIGRAM(S): 0.4 CAPSULE ORAL at 18:59

## 2022-03-25 RX ADMIN — ATORVASTATIN CALCIUM 40 MILLIGRAM(S): 80 TABLET, FILM COATED ORAL at 21:31

## 2022-03-25 RX ADMIN — PANTOPRAZOLE SODIUM 40 MILLIGRAM(S): 20 TABLET, DELAYED RELEASE ORAL at 18:58

## 2022-03-25 RX ADMIN — SODIUM CHLORIDE 1000 MILLILITER(S): 9 INJECTION INTRAMUSCULAR; INTRAVENOUS; SUBCUTANEOUS at 13:28

## 2022-03-25 RX ADMIN — SODIUM CHLORIDE 1000 MILLILITER(S): 9 INJECTION INTRAMUSCULAR; INTRAVENOUS; SUBCUTANEOUS at 11:37

## 2022-03-25 RX ADMIN — Medication 40 MILLIEQUIVALENT(S): at 20:33

## 2022-03-25 NOTE — H&P ADULT - ATTENDING COMMENTS
acute blood loss anemia from UGI bleed with duodenal ulcer  physical as above  continue protonix BID  can start diet   s/p transfusion PRBC; follow H/H  rest as above

## 2022-03-25 NOTE — ED PROVIDER NOTE - PROGRESS NOTE DETAILS
ICU consulted and will eval. Accepted to [facility]; per icu, pt going to egd w Dr Lindsey shortly.  Dr Mendez updated.

## 2022-03-25 NOTE — H&P ADULT - NSHPLABSRESULTS_GEN_ALL_CORE
MEDICATIONS:  MEDICATIONS  (STANDING):  atorvastatin 40 milliGRAM(s) Oral at bedtime  dextrose 5%. 1000 milliLiter(s) (50 mL/Hr) IV Continuous <Continuous>  dextrose 5%. 1000 milliLiter(s) (100 mL/Hr) IV Continuous <Continuous>  dextrose 50% Injectable 25 Gram(s) IV Push once  dextrose 50% Injectable 12.5 Gram(s) IV Push once  dextrose 50% Injectable 25 Gram(s) IV Push once  glucagon  Injectable 1 milliGRAM(s) IntraMuscular once  insulin lispro (ADMELOG) corrective regimen sliding scale   SubCutaneous three times a day before meals  pantoprazole  Injectable 40 milliGRAM(s) IV Push every 12 hours  tamsulosin 0.4 milliGRAM(s) Oral every 12 hours    MEDICATIONS  (PRN):  dextrose Oral Gel 15 Gram(s) Oral once PRN Blood Glucose LESS THAN 70 milliGRAM(s)/deciliter      ALLERGIES:  Allergies    No Known Allergies    Intolerances        LABS:                        7.2    10.89 )-----------( 333      ( 25 Mar 2022 11:23 )             22.0     03-25    141  |  108  |  45<H>  ----------------------------<  226<H>  4.3   |  25  |  0.95    Ca    8.6      25 Mar 2022 11:23    TPro  5.5<L>  /  Alb  3.5  /  TBili  0.2  /  DBili  x   /  AST  44<H>  /  ALT  66<H>  /  AlkPhos  62  03-25    PT/INR - ( 25 Mar 2022 11:23 )   PT: 14.0 sec;   INR: 1.17          PTT - ( 25 Mar 2022 11:23 )  PTT:25.0 sec      RADIOLOGY & ADDITIONAL TESTS: Reviewed.

## 2022-03-25 NOTE — H&P ADULT - ASSESSMENT
75 yo male with recent spinal stenosis surgery, HLD, urinary retention, who presents with dizziness, found to have new drop in hemoglobin, elevated BUN recent aspirin use, melanotic stools, admitted for concern for upper GI bleed. 77 yo male with recent spinal stenosis surgery, HLD, urinary retention, who presents with dizziness, found to have new drop in hemoglobin, elevated BUN recent aspirin use, melanotic stools, admitted for concern for upper GI bleed.

## 2022-03-25 NOTE — ED PROVIDER NOTE - OBJECTIVE STATEMENT
77 yo male h/o hld, spinal stenosis s/p recent c spine surgery 2-3 wk ago at [facility] w hospital course complicated by urinary retention s/p almazan (now removed, on tamsulosin) sent by pmd, Dr Mendez, for ugib.  Pt notes recent dizziness, worse w standing, x several days as well as palpitation sensation since yest as well as noting black stool x 5 d.  No h/o gib.  Pt reports nl colonoscopy in the past, no egd.  No abd pain, n/v/d.  Pt notes ~ 2 stool/d, 1 today so far; has been taking senna since surgery.  Pt was started on asa by his surgeon for dvt prophylaxis.  No other blood thinners.  Pt noted to be guaiac pos and also orthostatic at pmd's office (sbp 114-120 laying, 94 sitting, 74 standing; hr 100-120 at office).  Hgb 13.1 on 3/14.

## 2022-03-25 NOTE — ED ADULT TRIAGE NOTE - WEIGHT METHOD
Message left instructing patient to call [phone] in regards to chemo clearance appt and and labs    stated

## 2022-03-25 NOTE — PATIENT PROFILE ADULT - FALL HARM RISK - RISK INTERVENTIONS

## 2022-03-25 NOTE — H&P ADULT - PROBLEM SELECTOR PLAN 4
Known hx of spinal stenosis s/p recent c spine surgery 2-3 wk ago at [facility]  - Surescripts had Tramadol ordered, but pt was not taking  - Would avoid NSAIDS given duodenal ulcer on EGD  - f/u outpatient Known hx of spinal stenosis s/p recent c spine surgery 2-3 wk ago at [facility]  - Surescripts had Tramadol ordered, but pt was not taking given concern for urinary retention.   - Would avoid NSAIDS given duodenal ulcer on EGD  - f/u outpatient

## 2022-03-25 NOTE — H&P ADULT - PROBLEM SELECTOR PLAN 1
oriented to person, place, time and situation
Patient presents with 5 days of black stools, dizziness, and recent hemoglobin drop from 13-->7.1 in ~2 weeks time, elevated BUN, guaiac positive in outpatient doctor, likely secondary to GI bleed. DDx for cause of bleed aspirin use vs H pylori. No NSAID use, alcohol, liver dysfunction, herbs/supplement use, abnormal cscope.  -s/p 1L NS and 1U pRBC  - GI consulted, Dr. Lindsey performed EGD (3/25) = small hiatal hernia, gastritis, one duodenal ulcer white based, cratered 1 cm in diameter, no active signs of bleeding   - c/w IV PPI BID  - advance to regular diet

## 2022-03-25 NOTE — H&P ADULT - PROBLEM SELECTOR PLAN 2
Patient recently had urinary retention requiring indwelling almazan, removed yesterday 3/24. Voiding freely  -continue flomax 0.4mg BID  -Monitor UOP  -Obtain bladder scan if concerns for retention

## 2022-03-25 NOTE — PATIENT PROFILE ADULT - STATED REASON FOR ADMISSION
c/o dizziness, gen weakness-went to Dr Mendez, blood in stool-sent to [facility]

## 2022-03-25 NOTE — ED PROVIDER NOTE - GASTROINTESTINAL NEGATIVE STATEMENT, MLM
no abdominal pain, no bloating, no constipation, no diarrhea, no nausea and no vomiting. + black stool

## 2022-03-25 NOTE — ED ADULT TRIAGE NOTE - CHIEF COMPLAINT QUOTE
Pt sent in by MD for "blood in my stool and low blood pressure." Pt endorses feeling "lightheaded." Pt ambulatory with steady gait with c-collar in place r/t spinal surgery on 3/10.

## 2022-03-25 NOTE — PATIENT PROFILE ADULT - SURGICAL SITE INCISION
V-Y Flap Text: The defect edges were debeveled with a #15 scalpel blade.  Given the location of the defect, shape of the defect and the proximity to free margins a V-Y flap was deemed most appropriate.  Using a sterile surgical marker, an appropriate advancement flap was drawn incorporating the defect and placing the expected incisions within the relaxed skin tension lines where possible.    The area thus outlined was incised deep to adipose tissue with a #15 scalpel blade.  The skin margins were undermined to an appropriate distance in all directions utilizing iris scissors. Cheek-To-Nose Interpolation Flap Text: A decision was made to reconstruct the defect utilizing an interpolation axial flap and a staged reconstruction.  A telfa template was made of the defect.  This telfa template was then used to outline the Cheek-To-Nose Interpolation flap.  The donor area for the pedicle flap was then injected with anesthesia.  The flap was excised through the skin and subcutaneous tissue down to the layer of the underlying musculature.  The interpolation flap was carefully excised within this deep plane to maintain its blood supply.  The edges of the donor site were undermined.   The donor site was closed in a primary fashion.  The pedicle was then rotated into position and sutured.  Once the tube was sutured into place, adequate blood supply was confirmed with blanching and refill.  The pedicle was then wrapped with xeroform gauze and dressed appropriately with a telfa and gauze bandage to ensure continued blood supply and protect the attached pedicle. Epidermal Closure: running cuticular Show Biopsy Photo Reviewed Variable: Yes Repair Type: Complex Repair Stage 15: Additional Anesthesia Type: 1% lidocaine with epinephrine Validate Referring Provider (Can Hide Referring Provider In Settings Tab): No Consent (Near Eyelid Margin)/Introductory Paragraph: The rationale for Mohs was explained to the patient and consent was obtained. The risks, benefits and alternatives to therapy were discussed in detail. Specifically, the risks of ectropion or eyelid deformity, infection, scarring, bleeding, prolonged wound healing, incomplete removal, allergy to anesthesia, nerve injury and recurrence were addressed. Prior to the procedure, the treatment site was clearly identified and confirmed by the patient. All components of Universal Protocol/PAUSE Rule completed. Trilobed Flap Text: The defect edges were debeveled with a #15 scalpel blade.  Given the location of the defect and the proximity to free margins a trilobed flap was deemed most appropriate.  Using a sterile surgical marker, an appropriate trilobed flap drawn around the defect.    The area thus outlined was incised deep to adipose tissue with a #15 scalpel blade.  The skin margins were undermined to an appropriate distance in all directions utilizing iris scissors. Secondary Intention Text (Leave Blank If You Do Not Want): The defect will heal with secondary intention. Body Location Override (Optional - Billing Will Still Be Based On Selected Body Map Location If Applicable): left cheek Stage 5: Additional Anesthesia Volume In Cc: 0 Asc Procedure Text (A): After obtaining clear surgical margins the patient was sent to an ASC for surgical repair.  The patient understands they will receive post-surgical care and follow-up from the ASC physician. Partial Purse String (Simple) Text: Given the location of the defect and the characteristics of the surrounding skin a simple purse string closure was deemed most appropriate.  Undermining was performed circumfirentially around the surgical defect.  A purse string suture was then placed and tightened. Wound tension only allowed a partial closure of the circular defect. Plastic Surgeon Procedure Text (E): After obtaining clear surgical margins the patient was sent to plastics for surgical repair.  The patient understands they will receive post-surgical care and follow-up from the referring physician's office. Mucosal Advancement Flap Text: Given the location of the defect, shape of the defect and the proximity to free margins a mucosal advancement flap was deemed most appropriate. Incisions were made with a 15 blade scalpel in the appropriate fashion along the cutaneous vermilion border and the mucosal lip. The remaining actinically damaged mucosal tissue was excised.  The mucosal advancement flap was then elevated to the gingival sulcus with care taken to preserve the neurovascular structures and advanced into the primary defect. Care was taken to ensure that precise realignment of the vermilion border was achieved. Mohs Histo Method Verbiage: Each section was then chromacoded and processed in the Mohs lab using the Mohs protocol and submitted for frozen section. no X Size Of Lesion In Cm (Optional): 1 Posterior Auricular Interpolation Flap Text: A decision was made to reconstruct the defect utilizing an interpolation axial flap and a staged reconstruction.  A telfa template was made of the defect.  This telfa template was then used to outline the posterior auricular interpolation flap.  The donor area for the pedicle flap was then injected with anesthesia.  The flap was excised through the skin and subcutaneous tissue down to the layer of the underlying musculature.  The pedicle flap was carefully excised within this deep plane to maintain its blood supply.  The edges of the donor site were undermined.   The donor site was closed in a primary fashion.  The pedicle was then rotated into position and sutured.  Once the tube was sutured into place, adequate blood supply was confirmed with blanching and refill.  The pedicle was then wrapped with xeroform gauze and dressed appropriately with a telfa and gauze bandage to ensure continued blood supply and protect the attached pedicle. Otolaryngologist Procedure Text (B): After obtaining clear surgical margins the patient was sent to otolaryngology for surgical repair.  The patient understands they will receive post-surgical care and follow-up from the referring physician's office. Referred To Plastics For Closure Text (Leave Blank If You Do Not Want): After obtaining clear surgical margins the patient was sent to plastics for surgical repair.  The patient understands they will receive post-surgical care and follow-up from the referring physician's office. Wound Care (No Sutures): Petrolatum Alar Island Pedicle Flap Text: The defect edges were debeveled with a #15 scalpel blade.  Given the location of the defect, shape of the defect and the proximity to the alar rim an island pedicle advancement flap was deemed most appropriate.  Using a sterile surgical marker, an appropriate advancement flap was drawn incorporating the defect, outlining the appropriate donor tissue and placing the expected incisions within the nasal ala running parallel to the alar rim. The area thus outlined was incised with a #15 scalpel blade.  The skin margins were undermined minimally to an appropriate distance in all directions around the primary defect and laterally outward around the island pedicle utilizing iris scissors.  There was minimal undermining beneath the pedicle flap. Complex Repair And Flap Additional Text (Will Appearing After The Standard Complex Repair Text): The complex repair was not sufficient to completely close the primary defect. The remaining additional defect was repaired with the flap mentioned below. Oculoplastic Surgeon Procedure Text (D): After obtaining clear surgical margins the patient was sent to oculoplastics for surgical repair.  The patient understands they will receive post-surgical care and follow-up from the referring physician's office. Consent (Scalp)/Introductory Paragraph: The rationale for Mohs was explained to the patient and consent was obtained. The risks, benefits and alternatives to therapy were discussed in detail. Specifically, the risks of changes in hair growth pattern secondary to repair, infection, scarring, bleeding, prolonged wound healing, incomplete removal, allergy to anesthesia, nerve injury and recurrence were addressed. Prior to the procedure, the treatment site was clearly identified and confirmed by the patient. All components of Universal Protocol/PAUSE Rule completed. Asc Procedure Text (E): After obtaining clear surgical margins the patient was sent to an ASC for surgical repair.  The patient understands they will receive post-surgical care and follow-up from the ASC physician. Mohs Rapid Report Verbiage: The area of clinically evident tumor was marked with skin marking ink and appropriately hatched.  The initial incision was made following the Mohs approach through the skin.  The specimen was taken to the lab, divided into the necessary number of pieces, chromacoded and processed according to the Mohs protocol.  This was repeated in successive stages until a tumor free defect was achieved. Star Wedge Flap Text: The defect edges were debeveled with a #15 scalpel blade.  Given the location of the defect, shape of the defect and the proximity to free margins a star wedge flap was deemed most appropriate.  Using a sterile surgical marker, an appropriate rotation flap was drawn incorporating the defect and placing the expected incisions within the relaxed skin tension lines where possible. The area thus outlined was incised deep to adipose tissue with a #15 scalpel blade.  The skin margins were undermined to an appropriate distance in all directions utilizing iris scissors. Ear Wedge Repair Text: A wedge excision was completed by carrying down an excision through the full thickness of the ear and cartilage with an inward facing Burow's triangle. The wound was then closed in a layered fashion. Provider Procedure Text (B): After obtaining clear surgical margins the defect was repaired by another provider. Otolaryngologist Procedure Text (F): After obtaining clear surgical margins the patient was sent to otolaryngology for surgical repair.  The patient understands they will receive post-surgical care and follow-up from the referring physician's office. Transposition Flap Text: The defect edges were debeveled with a #15 scalpel blade.  Given the location of the defect and the proximity to free margins a transposition flap was deemed most appropriate.  Using a sterile surgical marker, an appropriate transposition flap was drawn incorporating the defect.    The area thus outlined was incised deep to adipose tissue with a #15 scalpel blade.  The skin margins were undermined to an appropriate distance in all directions utilizing iris scissors. Full Thickness Lip Wedge Repair (Flap) Text: Given the location of the defect and the proximity to free margins a full thickness wedge repair was deemed most appropriate.  Using a sterile surgical marker, the appropriate repair was drawn incorporating the defect and placing the expected incisions perpendicular to the vermilion border.  The vermilion border was also meticulously outlined to ensure appropriate reapproximation during the repair.  The area thus outlined was incised through and through with a #15 scalpel blade.  The muscularis and dermis were reaproximated with deep sutures following hemostasis. Care was taken to realign the vermilion border before proceeding with the superficial closure.  Once the vermilion was realigned the superfical and mucosal closure was finished. Bcc Histology Text: There were numerous aggregates of basaloid cells. Advancement Flap (Double) Text: The defect edges were debeveled with a #15 scalpel blade.  Given the location of the defect and the proximity to free margins a double advancement flap was deemed most appropriate.  Using a sterile surgical marker, the appropriate advancement flaps were drawn incorporating the defect and placing the expected incisions within the relaxed skin tension lines where possible.    The area thus outlined was incised deep to adipose tissue with a #15 scalpel blade.  The skin margins were undermined to an appropriate distance in all directions utilizing iris scissors. Additional Anesthesia Volume In Cc: 6 O-Z Plasty Text: The defect edges were debeveled with a #15 scalpel blade.  Given the location of the defect, shape of the defect and the proximity to free margins an O-Z plasty (double transposition flap) was deemed most appropriate.  Using a sterile surgical marker, the appropriate transposition flaps were drawn incorporating the defect and placing the expected incisions within the relaxed skin tension lines where possible.    The area thus outlined was incised deep to adipose tissue with a #15 scalpel blade.  The skin margins were undermined to an appropriate distance in all directions utilizing iris scissors.  Hemostasis was achieved with electrocautery.  The flaps were then transposed into place, one clockwise and the other counterclockwise, and anchored with interrupted buried subcutaneous sutures. M-Plasty Complex Repair Preamble Text (Leave Blank If You Do Not Want): Extensive wide undermining was performed. Referring Physician (Optional): BHAVANA Singh Rhomboid Transposition Flap Text: The defect edges were debeveled with a #15 scalpel blade.  Given the location of the defect and the proximity to free margins a rhomboid transposition flap was deemed most appropriate.  Using a sterile surgical marker, an appropriate rhomboid flap was drawn incorporating the defect.    The area thus outlined was incised deep to adipose tissue with a #15 scalpel blade.  The skin margins were undermined to an appropriate distance in all directions utilizing iris scissors. Composite Graft Text: The defect edges were debeveled with a #15 scalpel blade.  Given the location of the defect, shape of the defect, the proximity to free margins and the fact the defect was full thickness a composite graft was deemed most appropriate.  The defect was outline and then transferred to the donor site.  A full thickness graft was then excised from the donor site. The graft was then placed in the primary defect, oriented appropriately and then sutured into place.  The secondary defect was then repaired using a primary closure. Area M Indication Text: Tumors in this location are included in Area M (cheek, forehead, scalp, neck, jawline and pretibial skin).  Mohs surgery is indicated for tumors in these anatomic locations. Medical Necessity Statement: Based on my medical judgement, Mohs surgery is the most appropriate treatment for this cancer compared to other treatments. O-T Advancement Flap Text: The defect edges were debeveled with a #15 scalpel blade.  Given the location of the defect, shape of the defect and the proximity to free margins an O-T advancement flap was deemed most appropriate.  Using a sterile surgical marker, an appropriate advancement flap was drawn incorporating the defect and placing the expected incisions within the relaxed skin tension lines where possible.    The area thus outlined was incised deep to adipose tissue with a #15 scalpel blade.  The skin margins were undermined to an appropriate distance in all directions utilizing iris scissors. H Plasty Text: Given the location of the defect, shape of the defect and the proximity to free margins a H-plasty was deemed most appropriate for repair.  Using a sterile surgical marker, the appropriate advancement arms of the H-plasty were drawn incorporating the defect and placing the expected incisions within the relaxed skin tension lines where possible. The area thus outlined was incised deep to adipose tissue with a #15 scalpel blade. The skin margins were undermined to an appropriate distance in all directions utilizing iris scissors.  The opposing advancement arms were then advanced into place in opposite direction and anchored with interrupted buried subcutaneous sutures. Mid-Level Procedure Text (D): After obtaining clear surgical margins the patient was sent to a mid-level provider for surgical repair.  The patient understands they will receive post-surgical care and follow-up from the mid-level provider. Ear Star Wedge Flap Text: The defect edges were debeveled with a #15 blade scalpel.  Given the location of the defect and the proximity to free margins (helical rim) an ear star wedge flap was deemed most appropriate.  Using a sterile surgical marker, the appropriate flap was drawn incorporating the defect and placing the expected incisions between the helical rim and antihelix where possible.  The area thus outlined was incised through and through with a #15 scalpel blade. M-Plasty Intermediate Repair Preamble Text (Leave Blank If You Do Not Want): Undermining was performed with blunt dissection. Consent 3/Introductory Paragraph: I gave the patient a chance to ask questions they had about the procedure.  Following this I explained the Mohs procedure and consent was obtained. The risks, benefits and alternatives to therapy were discussed in detail. Specifically, the risks of infection, scarring, bleeding, prolonged wound healing, incomplete removal, allergy to anesthesia, nerve injury and recurrence were addressed. Prior to the procedure, the treatment site was clearly identified and confirmed by the patient. All components of Universal Protocol/PAUSE Rule completed. Tissue Cultured Epidermal Autograft Text: The defect edges were debeveled with a #15 scalpel blade.  Given the location of the defect, shape of the defect and the proximity to free margins a tissue cultured epidermal autograft was deemed most appropriate.  The graft was then trimmed to fit the size of the defect.  The graft was then placed in the primary defect and oriented appropriately. Xenograft Text: The defect edges were debeveled with a #15 scalpel blade.  Given the location of the defect, shape of the defect and the proximity to free margins a xenograft was deemed most appropriate.  The graft was then trimmed to fit the size of the defect.  The graft was then placed in the primary defect and oriented appropriately. Advancement-Rotation Flap Text: The defect edges were debeveled with a #15 scalpel blade.  Given the location of the defect, shape of the defect and the proximity to free margins an advancement-rotation flap was deemed most appropriate.  Using a sterile surgical marker, an appropriate flap was drawn incorporating the defect and placing the expected incisions within the relaxed skin tension lines where possible. The area thus outlined was incised deep to adipose tissue with a #15 scalpel blade.  The skin margins were undermined to an appropriate distance in all directions utilizing iris scissors. Eye Protection Verbiage: Before proceeding with the stage, a plastic scleral shield was inserted. The globe was anesthetized with a few drops of 1% lidocaine with 1:100,000 epinephrine. Then, an appropriate sized scleral shield was chosen and coated with lacrilube ointment. The shield was gently inserted and left in place for the duration of each stage. After the stage was completed, the shield was gently removed. Post-Care Instructions: I reviewed with the patient in detail post-care instructions. Patient is not to engage in any heavy lifting, exercise, or swimming for the next 14 days. Should the patient develop any fevers, chills, bleeding, severe pain patient will contact the office immediately. Interpolation Flap Text: A decision was made to reconstruct the defect utilizing an interpolation axial flap and a staged reconstruction.  A telfa template was made of the defect.  This telfa template was then used to outline the interpolation flap.  The donor area for the pedicle flap was then injected with anesthesia.  The flap was excised through the skin and subcutaneous tissue down to the layer of the underlying musculature.  The interpolation flap was carefully excised within this deep plane to maintain its blood supply.  The edges of the donor site were undermined.   The donor site was closed in a primary fashion.  The pedicle was then rotated into position and sutured.  Once the tube was sutured into place, adequate blood supply was confirmed with blanching and refill.  The pedicle was then wrapped with xeroform gauze and dressed appropriately with a telfa and gauze bandage to ensure continued blood supply and protect the attached pedicle. Same Histology In Subsequent Stages Text: The pattern and morphology of the tumor is as described in the first stage. No Repair - Repaired With Adjacent Surgical Defect Text (Leave Blank If You Do Not Want): After obtaining clear surgical margins the defect was repaired concurrently with another surgical defect which was in close approximation. Dressing: dry sterile dressing Number Of Stages: 2 Dorsal Nasal Flap Text: The defect edges were debeveled with a #15 scalpel blade.  Given the location of the defect and the proximity to free margins a dorsal nasal flap was deemed most appropriate.  Using a sterile surgical marker, an appropriate dorsal nasal flap was drawn around the defect.    The area thus outlined was incised deep to adipose tissue with a #15 scalpel blade.  The skin margins were undermined to an appropriate distance in all directions utilizing iris scissors. Consent (Ear)/Introductory Paragraph: The rationale for Mohs was explained to the patient and consent was obtained. The risks, benefits and alternatives to therapy were discussed in detail. Specifically, the risks of ear deformity, infection, scarring, bleeding, prolonged wound healing, incomplete removal, allergy to anesthesia, nerve injury and recurrence were addressed. Prior to the procedure, the treatment site was clearly identified and confirmed by the patient. All components of Universal Protocol/PAUSE Rule completed. Partial Purse String (Intermediate) Text: Given the location of the defect and the characteristics of the surrounding skin an intermediate purse string closure was deemed most appropriate.  Undermining was performed circumfirentially around the surgical defect.  A purse string suture was then placed and tightened. Wound tension only allowed a partial closure of the circular defect. Hatchet Flap Text: The defect edges were debeveled with a #15 scalpel blade.  Given the location of the defect, shape of the defect and the proximity to free margins a hatchet flap was deemed most appropriate.  Using a sterile surgical marker, an appropriate hatchet flap was drawn incorporating the defect and placing the expected incisions within the relaxed skin tension lines where possible.    The area thus outlined was incised deep to adipose tissue with a #15 scalpel blade.  The skin margins were undermined to an appropriate distance in all directions utilizing iris scissors. Suturegard Intro: Intraoperative tissue expansion was performed, utilizing the SUTUREGARD device, in order to reduce wound tension. Oculoplastic Surgeon Procedure Text (E): After obtaining clear surgical margins the patient was sent to oculoplastics for surgical repair.  The patient understands they will receive post-surgical care and follow-up from the referring physician's office. Paramedian Forehead Flap Text: A decision was made to reconstruct the defect utilizing an interpolation axial flap and a staged reconstruction.  A telfa template was made of the defect.  This telfa template was then used to outline the paramedian forehead pedicle flap.  The donor area for the pedicle flap was then injected with anesthesia.  The flap was excised through the skin and subcutaneous tissue down to the layer of the underlying musculature.  The pedicle flap was carefully excised within this deep plane to maintain its blood supply.  The edges of the donor site were undermined.   The donor site was closed in a primary fashion.  The pedicle was then rotated into position and sutured.  Once the tube was sutured into place, adequate blood supply was confirmed with blanching and refill.  The pedicle was then wrapped with xeroform gauze and dressed appropriately with a telfa and gauze bandage to ensure continued blood supply and protect the attached pedicle. Closure 4 Information: This tab is for additional flaps and grafts above and beyond our usual structured repairs.  Please note if you enter information here it will not currently bill and you will need to add the billing information manually. Deep Sutures: 4-0 Maxon Double Island Pedicle Flap Text: The defect edges were debeveled with a #15 scalpel blade.  Given the location of the defect, shape of the defect and the proximity to free margins a double island pedicle advancement flap was deemed most appropriate.  Using a sterile surgical marker, an appropriate advancement flap was drawn incorporating the defect, outlining the appropriate donor tissue and placing the expected incisions within the relaxed skin tension lines where possible.    The area thus outlined was incised deep to adipose tissue with a #15 scalpel blade.  The skin margins were undermined to an appropriate distance in all directions around the primary defect and laterally outward around the island pedicle utilizing iris scissors.  There was minimal undermining beneath the pedicle flap. Detail Level: Detailed Complex Repair And Graft Additional Text (Will Appearing After The Standard Complex Repair Text): The complex repair was not sufficient to completely close the primary defect. The remaining additional defect was repaired with the graft mentioned below. Hemostasis: Electrocautery Muscle Hinge Flap Text: The defect edges were debeveled with a #15 scalpel blade.  Given the size, depth and location of the defect and the proximity to free margins a muscle hinge flap was deemed most appropriate.  Using a sterile surgical marker, an appropriate hinge flap was drawn incorporating the defect. The area thus outlined was incised with a #15 scalpel blade.  The skin margins were undermined to an appropriate distance in all directions utilizing iris scissors. Ftsg Text: The defect edges were debeveled with a #15 scalpel blade.  Given the location of the defect, shape of the defect and the proximity to free margins a full thickness skin graft was deemed most appropriate.  Using a sterile surgical marker, the primary defect shape was transferred to the donor site. The area thus outlined was incised deep to adipose tissue with a #15 scalpel blade.  The harvested graft was then trimmed of adipose tissue until only dermis and epidermis was left.  The skin margins of the secondary defect were undermined to an appropriate distance in all directions utilizing iris scissors.  The secondary defect was closed with interrupted buried subcutaneous sutures.  The skin edges were then re-apposed with running  sutures.  The skin graft was then placed in the primary defect and oriented appropriately. Bcc Infiltrative Histology Text: There were numerous aggregates of basaloid cells demonstrating an infiltrative pattern. Burow's Advancement Flap Text: The defect edges were debeveled with a #15 scalpel blade.  Given the location of the defect and the proximity to free margins a Burow's advancement flap was deemed most appropriate.  Using a sterile surgical marker, the appropriate advancement flap was drawn incorporating the defect and placing the expected incisions within the relaxed skin tension lines where possible.    The area thus outlined was incised deep to adipose tissue with a #15 scalpel blade.  The skin margins were undermined to an appropriate distance in all directions utilizing iris scissors. Consent Type: Consent 1 (Standard) Double O-Z Plasty Text: The defect edges were debeveled with a #15 scalpel blade.  Given the location of the defect, shape of the defect and the proximity to free margins a Double O-Z plasty (double transposition flap) was deemed most appropriate.  Using a sterile surgical marker, the appropriate transposition flaps were drawn incorporating the defect and placing the expected incisions within the relaxed skin tension lines where possible. The area thus outlined was incised deep to adipose tissue with a #15 scalpel blade.  The skin margins were undermined to an appropriate distance in all directions utilizing iris scissors.  Hemostasis was achieved with electrocautery.  The flaps were then transposed into place, one clockwise and the other counterclockwise, and anchored with interrupted buried subcutaneous sutures. Closure 3 Information: This tab is for additional flaps and grafts above and beyond our usual structured repairs.  Please note if you enter information here it will not currently bill and you will need to add the billing information manually. Mid-Level Procedure Text (A): After obtaining clear surgical margins the patient was sent to a mid-level provider for surgical repair.  The patient understands they will receive post-surgical care and follow-up from the mid-level provider. Retention Suture Bite Size: 3 mm Bi-Rhombic Flap Text: The defect edges were debeveled with a #15 scalpel blade.  Given the location of the defect and the proximity to free margins a bi-rhombic flap was deemed most appropriate.  Using a sterile surgical marker, an appropriate rhombic flap was drawn incorporating the defect. The area thus outlined was incised deep to adipose tissue with a #15 scalpel blade.  The skin margins were undermined to an appropriate distance in all directions utilizing iris scissors. Area L Indication Text: Tumors in this location are included in Area L (trunk and extremities).  Mohs surgery is indicated for larger tumors, or tumors with aggressive histologic features, in these anatomic locations. Epidermal Autograft Text: The defect edges were debeveled with a #15 scalpel blade.  Given the location of the defect, shape of the defect and the proximity to free margins an epidermal autograft was deemed most appropriate.  Using a sterile surgical marker, the primary defect shape was transferred to the donor site. The epidermal graft was then harvested.  The skin graft was then placed in the primary defect and oriented appropriately. Alternatives Discussed Intro (Do Not Add Period): I discussed alternative treatments to Mohs surgery and specifically discussed the risks and benefits of Information: Selecting Yes will display possible errors in your note based on the variables you have selected. This validation is only offered as a suggestion for you. PLEASE NOTE THAT THE VALIDATION TEXT WILL BE REMOVED WHEN YOU FINALIZE YOUR NOTE. IF YOU WANT TO FAX A PRELIMINARY NOTE YOU WILL NEED TO TOGGLE THIS TO 'NO' IF YOU DO NOT WANT IT IN YOUR FAXED NOTE. O-L Flap Text: The defect edges were debeveled with a #15 scalpel blade.  Given the location of the defect, shape of the defect and the proximity to free margins an O-L flap was deemed most appropriate.  Using a sterile surgical marker, an appropriate advancement flap was drawn incorporating the defect and placing the expected incisions within the relaxed skin tension lines where possible.    The area thus outlined was incised deep to adipose tissue with a #15 scalpel blade.  The skin margins were undermined to an appropriate distance in all directions utilizing iris scissors. W Plasty Text: The lesion was extirpated to the level of the fat with a #15 scalpel blade.  Given the location of the defect, shape of the defect and the proximity to free margins a W-plasty was deemed most appropriate for repair.  Using a sterile surgical marker, the appropriate transposition arms of the W-plasty were drawn incorporating the defect and placing the expected incisions within the relaxed skin tension lines where possible.    The area thus outlined was incised deep to adipose tissue with a #15 scalpel blade.  The skin margins were undermined to an appropriate distance in all directions utilizing iris scissors.  The opposing transposition arms were then transposed into place in opposite direction and anchored with interrupted buried subcutaneous sutures. Manual Repair Warning Statement: We plan on removing the manually selected variable below in favor of our much easier automatic structured text blocks found in the previous tab. We decided to do this to help make the flow better and give you the full power of structured data. Manual selection is never going to be ideal in our platform and I would encourage you to avoid using manual selection from this point on, especially since I will be sunsetting this feature. It is important that you do one of two things with the customized text below. First, you can save all of the text in a word file so you can have it for future reference. Second, transfer the text to the appropriate area in the Library tab. Lastly, if there is a flap or graft type which we do not have you need to let us know right away so I can add it in before the variable is hidden. No need to panic, we plan to give you roughly 6 months to make the change. Unna Boot Text: An Unna boot was placed to help immobilize the limb and facilitate more rapid healing. Previous Accession (Optional): [id number] Simple / Intermediate / Complex Repair - Final Wound Length In Cm: 5.2 Banner Transposition Flap Text: The defect edges were debeveled with a #15 scalpel blade.  Given the location of the defect and the proximity to free margins a Banner transposition flap was deemed most appropriate.  Using a sterile surgical marker, an appropriate flap drawn around the defect. The area thus outlined was incised deep to adipose tissue with a #15 scalpel blade.  The skin margins were undermined to an appropriate distance in all directions utilizing iris scissors. Epidermal Closure Graft Donor Site (Optional): simple interrupted Consent (Temporal Branch)/Introductory Paragraph: The rationale for Mohs was explained to the patient and consent was obtained. The risks, benefits and alternatives to therapy were discussed in detail. Specifically, the risks of damage to the temporal branch of the facial nerve, infection, scarring, bleeding, prolonged wound healing, incomplete removal, allergy to anesthesia, and recurrence were addressed. Prior to the procedure, the treatment site was clearly identified and confirmed by the patient. All components of Universal Protocol/PAUSE Rule completed. Bilobed Flap Text: The defect edges were debeveled with a #15 scalpel blade.  Given the location of the defect and the proximity to free margins a bilobe flap was deemed most appropriate.  Using a sterile surgical marker, an appropriate bilobe flap drawn around the defect.    The area thus outlined was incised deep to adipose tissue with a #15 scalpel blade.  The skin margins were undermined to an appropriate distance in all directions utilizing iris scissors. Non-Graft Cartilage Fenestration Text: The cartilage was fenestrated with a 2mm punch biopsy to help facilitate healing. Consent (Marginal Mandibular)/Introductory Paragraph: The rationale for Mohs was explained to the patient and consent was obtained. The risks, benefits and alternatives to therapy were discussed in detail. Specifically, the risks of damage to the marginal mandibular branch of the facial nerve, infection, scarring, bleeding, prolonged wound healing, incomplete removal, allergy to anesthesia, and recurrence were addressed. Prior to the procedure, the treatment site was clearly identified and confirmed by the patient. All components of Universal Protocol/PAUSE Rule completed. Purse String (Simple) Text: Given the location of the defect and the characteristics of the surrounding skin a purse string closure was deemed most appropriate.  Undermining was performed circumfirentially around the surgical defect.  A purse string suture was then placed and tightened. Mercedes Flap Text: The defect edges were debeveled with a #15 scalpel blade.  Given the location of the defect, shape of the defect and the proximity to free margins a Mercedes flap was deemed most appropriate.  Using a sterile surgical marker, an appropriate advancement flap was drawn incorporating the defect and placing the expected incisions within the relaxed skin tension lines where possible. The area thus outlined was incised deep to adipose tissue with a #15 scalpel blade.  The skin margins were undermined to an appropriate distance in all directions utilizing iris scissors. Mohs Method Verbiage: An incision at a 45 degree angle following the standard Mohs approach was done and the specimen was harvested as a microscopic controlled layer. Primary Defect Length In Cm (Final Defect Size - Required For Flaps/Grafts): 2.4 Melolabial Interpolation Flap Text: A decision was made to reconstruct the defect utilizing an interpolation axial flap and a staged reconstruction.  A telfa template was made of the defect.  This telfa template was then used to outline the melolabial interpolation flap.  The donor area for the pedicle flap was then injected with anesthesia.  The flap was excised through the skin and subcutaneous tissue down to the layer of the underlying musculature.  The pedicle flap was carefully excised within this deep plane to maintain its blood supply.  The edges of the donor site were undermined.   The donor site was closed in a primary fashion.  The pedicle was then rotated into position and sutured.  Once the tube was sutured into place, adequate blood supply was confirmed with blanching and refill.  The pedicle was then wrapped with xeroform gauze and dressed appropriately with a telfa and gauze bandage to ensure continued blood supply and protect the attached pedicle. No Residual Tumor Seen Histology Text: There were no malignant cells seen in the sections examined. Closure 2 Information: This tab is for additional flaps and grafts, including complex repair and grafts and complex repair and flaps. You can also specify a different location for the additional defect, if the location is the same you do not need to select a new one. We will insert the automated text for the repair you select below just as we do for solitary flaps and grafts. Please note that at this time if you select a location with a different insurance zone you will need to override the ICD10 and CPT if appropriate. Island Pedicle Flap Text: The defect edges were debeveled with a #15 scalpel blade.  Given the location of the defect, shape of the defect and the proximity to free margins an island pedicle advancement flap was deemed most appropriate.  Using a sterile surgical marker, an appropriate advancement flap was drawn incorporating the defect, outlining the appropriate donor tissue and placing the expected incisions within the relaxed skin tension lines where possible.    The area thus outlined was incised deep to adipose tissue with a #15 scalpel blade.  The skin margins were undermined to an appropriate distance in all directions around the primary defect and laterally outward around the island pedicle utilizing iris scissors.  There was minimal undermining beneath the pedicle flap. Consent (Nose)/Introductory Paragraph: The rationale for Mohs was explained to the patient and consent was obtained. The risks, benefits and alternatives to therapy were discussed in detail. Specifically, the risks of nasal deformity, changes in the flow of air through the nose, infection, scarring, bleeding, prolonged wound healing, incomplete removal, allergy to anesthesia, nerve injury and recurrence were addressed. Prior to the procedure, the treatment site was clearly identified and confirmed by the patient. All components of Universal Protocol/PAUSE Rule completed. Localized Dermabrasion Text: The patient was draped in routine manner.  Localized dermabrasion using 3 x 17 mm wire brush was performed in routine manner to papillary dermis. This spot dermabrasion is being performed to complete skin cancer reconstruction. It also will eliminate the other sun damaged precancerous cells that are known to be part of the regional effect of a lifetime's worth of sun exposure. This localized dermabrasion is therapeutic and should not be considered cosmetic in any regard. Rotation Flap Text: The defect edges were debeveled with a #15 scalpel blade.  Given the location of the defect, shape of the defect and the proximity to free margins a rotation flap was deemed most appropriate.  Using a sterile surgical marker, an appropriate rotation flap was drawn incorporating the defect and placing the expected incisions within the relaxed skin tension lines where possible.    The area thus outlined was incised deep to adipose tissue with a #15 scalpel blade.  The skin margins were undermined to an appropriate distance in all directions utilizing iris scissors. Suturegard Body: The suture ends were repeatedly re-tightened and re-clamped to achieve the desired tissue expansion. Surgeon Performing Repair (Optional): Ali Damavandy, MD [facility] Instructions: By selecting yes to the question below the [facility] number will be added into the note.  This will be calculated automatically based on the diagnosis chosen, the size entered, the body zone selected (H,M,L) and the specific indications you chose. You will also have the option to override the Mohs AUC if you disagree with the automatically calculated number and this option is found in the Case Summary tab. Cheiloplasty (Less Than 50%) Text: A decision was made to reconstruct the defect with a  cheiloplasty.  The defect was undermined extensively.  Additional obicularis oris muscle was excised with a 15 blade scalpel.  The defect was converted into a full thickness wedge, of less than 50% of the vertical height of the lip, to facilite a better cosmetic result.  Small vessels were then tied off with 5-0 monocyrl. The obicularis oris, superficial fascia, adipose and dermis were then reapproximated.  After the deeper layers were approximated the epidermis was reapproximated with particular care given to realign the vermilion border. Estimated Blood Loss (Cc): minimal Island Pedicle Flap-Requiring Vessel Identification Text: The defect edges were debeveled with a #15 scalpel blade.  Given the location of the defect, shape of the defect and the proximity to free margins an island pedicle advancement flap was deemed most appropriate.  Using a sterile surgical marker, an appropriate advancement flap was drawn, based on the axial vessel mentioned above, incorporating the defect, outlining the appropriate donor tissue and placing the expected incisions within the relaxed skin tension lines where possible.    The area thus outlined was incised deep to adipose tissue with a #15 scalpel blade.  The skin margins were undermined to an appropriate distance in all directions around the primary defect and laterally outward around the island pedicle utilizing iris scissors.  There was minimal undermining beneath the pedicle flap. Keystone Flap Text: The defect edges were debeveled with a #15 scalpel blade.  Given the location of the defect, shape of the defect a keystone flap was deemed most appropriate.  Using a sterile surgical marker, an appropriate keystone flap was drawn incorporating the defect, outlining the appropriate donor tissue and placing the expected incisions within the relaxed skin tension lines where possible. The area thus outlined was incised deep to adipose tissue with a #15 scalpel blade.  The skin margins were undermined to an appropriate distance in all directions around the primary defect and laterally outward around the flap utilizing iris scissors. Melolabial Transposition Flap Text: The defect edges were debeveled with a #15 scalpel blade.  Given the location of the defect and the proximity to free margins a melolabial flap was deemed most appropriate.  Using a sterile surgical marker, an appropriate melolabial transposition flap was drawn incorporating the defect.    The area thus outlined was incised deep to adipose tissue with a #15 scalpel blade.  The skin margins were undermined to an appropriate distance in all directions utilizing iris scissors. Split-Thickness Skin Graft Text: The defect edges were debeveled with a #15 scalpel blade.  Given the location of the defect, shape of the defect and the proximity to free margins a split thickness skin graft was deemed most appropriate.  Using a sterile surgical marker, the primary defect shape was transferred to the donor site. The split thickness graft was then harvested.  The skin graft was then placed in the primary defect and oriented appropriately. Crescentic Advancement Flap Text: The defect edges were debeveled with a #15 scalpel blade.  Given the location of the defect and the proximity to free margins a crescentic advancement flap was deemed most appropriate.  Using a sterile surgical marker, the appropriate advancement flap was drawn incorporating the defect and placing the expected incisions within the relaxed skin tension lines where possible.    The area thus outlined was incised deep to adipose tissue with a #15 scalpel blade.  The skin margins were undermined to an appropriate distance in all directions utilizing iris scissors. Epidermal Sutures: 6-0 Prolene S Plasty Text: Given the location and shape of the defect, and the orientation of relaxed skin tension lines, an S-plasty was deemed most appropriate for repair.  Using a sterile surgical marker, the appropriate outline of the S-plasty was drawn, incorporating the defect and placing the expected incisions within the relaxed skin tension lines where possible.  The area thus outlined was incised deep to adipose tissue with a #15 scalpel blade.  The skin margins were undermined to an appropriate distance in all directions utilizing iris scissors. The skin flaps were advanced over the defect.  The opposing margins were then approximated with interrupted buried subcutaneous sutures. Helical Rim Advancement Flap Text: The defect edges were debeveled with a #15 blade scalpel.  Given the location of the defect and the proximity to free margins (helical rim) a double helical rim advancement flap was deemed most appropriate.  Using a sterile surgical marker, the appropriate advancement flaps were drawn incorporating the defect and placing the expected incisions between the helical rim and antihelix where possible.  The area thus outlined was incised through and through with a #15 scalpel blade.  With a skin hook and iris scissors, the flaps were gently and sharply undermined and freed up. Tumor Debulked?: scalpel Consent 1/Introductory Paragraph: The rationale for Mohs was explained to the patient and consent was obtained. The risks, benefits and alternatives to therapy were discussed in detail. Specifically, the risks of infection, scarring, bleeding, prolonged wound healing, incomplete removal, allergy to anesthesia, nerve injury and recurrence were addressed. Prior to the procedure, the treatment site was clearly identified and confirmed by the patient. All components of Universal Protocol/PAUSE Rule completed. Dermal Autograft Text: The defect edges were debeveled with a #15 scalpel blade.  Given the location of the defect, shape of the defect and the proximity to free margins a dermal autograft was deemed most appropriate.  Using a sterile surgical marker, the primary defect shape was transferred to the donor site. The area thus outlined was incised deep to adipose tissue with a #15 scalpel blade.  The harvested graft was then trimmed of adipose and epidermal tissue until only dermis was left.  The skin graft was then placed in the primary defect and oriented appropriately. O-Z Flap Text: The defect edges were debeveled with a #15 scalpel blade.  Given the location of the defect, shape of the defect and the proximity to free margins an O-Z flap was deemed most appropriate.  Using a sterile surgical marker, an appropriate transposition flap was drawn incorporating the defect and placing the expected incisions within the relaxed skin tension lines where possible. The area thus outlined was incised deep to adipose tissue with a #15 scalpel blade.  The skin margins were undermined to an appropriate distance in all directions utilizing iris scissors. Z Plasty Text: The lesion was extirpated to the level of the fat with a #15 scalpel blade.  Given the location of the defect, shape of the defect and the proximity to free margins a Z-plasty was deemed most appropriate for repair.  Using a sterile surgical marker, the appropriate transposition arms of the Z-plasty were drawn incorporating the defect and placing the expected incisions within the relaxed skin tension lines where possible.    The area thus outlined was incised deep to adipose tissue with a #15 scalpel blade.  The skin margins were undermined to an appropriate distance in all directions utilizing iris scissors.  The opposing transposition arms were then transposed into place in opposite direction and anchored with interrupted buried subcutaneous sutures. Home Suture Removal Text: Patient was provided instructions on removing sutures and will remove their sutures at home.  If they have any questions or difficulties they will call the office. Graft Donor Site Bandage (Optional-Leave Blank If You Don't Want In Note): Steri-strips and a pressure bandage were applied to the donor site. Bilobed Transposition Flap Text: The defect edges were debeveled with a #15 scalpel blade.  Given the location of the defect and the proximity to free margins a bilobed transposition flap was deemed most appropriate.  Using a sterile surgical marker, an appropriate bilobe flap drawn around the defect.    The area thus outlined was incised deep to adipose tissue with a #15 scalpel blade.  The skin margins were undermined to an appropriate distance in all directions utilizing iris scissors. Consent (Spinal Accessory)/Introductory Paragraph: The rationale for Mohs was explained to the patient and consent was obtained. The risks, benefits and alternatives to therapy were discussed in detail. Specifically, the risks of damage to the spinal accessory nerve, infection, scarring, bleeding, prolonged wound healing, incomplete removal, allergy to anesthesia, and recurrence were addressed. Prior to the procedure, the treatment site was clearly identified and confirmed by the patient. All components of Universal Protocol/PAUSE Rule completed. Graft Cartilage Fenestration Text: The cartilage was fenestrated with a 2mm punch biopsy to help facilitate graft survival and healing. Primary Defect Width In Cm (Final Defect Size - Required For Flaps/Grafts): 2.1 Purse String (Intermediate) Text: Given the location of the defect and the characteristics of the surrounding skin a purse string intermediate closure was deemed most appropriate.  Undermining was performed circumfirentially around the surgical defect.  A purse string suture was then placed and tightened. Modified Advancement Flap Text: The defect edges were debeveled with a #15 scalpel blade.  Given the location of the defect, shape of the defect and the proximity to free margins a modified advancement flap was deemed most appropriate.  Using a sterile surgical marker, an appropriate advancement flap was drawn incorporating the defect and placing the expected incisions within the relaxed skin tension lines where possible.    The area thus outlined was incised deep to adipose tissue with a #15 scalpel blade.  The skin margins were undermined to an appropriate distance in all directions utilizing iris scissors. Surgeon/Pathologist Verbiage (Will Incorporate Name Of Surgeon From Intro If Not Blank): operated in two distinct and integrated capacities as the surgeon and pathologist. Mastoid Interpolation Flap Text: A decision was made to reconstruct the defect utilizing an interpolation axial flap and a staged reconstruction.  A telfa template was made of the defect.  This telfa template was then used to outline the mastoid interpolation flap.  The donor area for the pedicle flap was then injected with anesthesia.  The flap was excised through the skin and subcutaneous tissue down to the layer of the underlying musculature.  The pedicle flap was carefully excised within this deep plane to maintain its blood supply.  The edges of the donor site were undermined.   The donor site was closed in a primary fashion.  The pedicle was then rotated into position and sutured.  Once the tube was sutured into place, adequate blood supply was confirmed with blanching and refill.  The pedicle was then wrapped with xeroform gauze and dressed appropriately with a telfa and gauze bandage to ensure continued blood supply and protect the attached pedicle. Inflammation Suggestive Of Cancer Camouflage Histology Text: There was a dense lymphocytic infiltrate which prevented adequate histologic evaluation of adjacent structures. Date Of Previous Biopsy (Optional): 3/8/19 Island Pedicle Flap With Canthal Suspension Text: The defect edges were debeveled with a #15 scalpel blade.  Given the location of the defect, shape of the defect and the proximity to free margins an island pedicle advancement flap was deemed most appropriate.  Using a sterile surgical marker, an appropriate advancement flap was drawn incorporating the defect, outlining the appropriate donor tissue and placing the expected incisions within the relaxed skin tension lines where possible. The area thus outlined was incised deep to adipose tissue with a #15 scalpel blade.  The skin margins were undermined to an appropriate distance in all directions around the primary defect and laterally outward around the island pedicle utilizing iris scissors.  There was minimal undermining beneath the pedicle flap. A suspension suture was placed in the canthal tendon to prevent tension and prevent ectropion. Initial Size Of Lesion: 0.9 Tarsorrhaphy Text: A tarsorrhaphy was performed using Frost sutures. Consent (Lip)/Introductory Paragraph: The rationale for Mohs was explained to the patient and consent was obtained. The risks, benefits and alternatives to therapy were discussed in detail. Specifically, the risks of lip deformity, changes in the oral aperture, infection, scarring, bleeding, prolonged wound healing, incomplete removal, allergy to anesthesia, nerve injury and recurrence were addressed. Prior to the procedure, the treatment site was clearly identified and confirmed by the patient. All components of Universal Protocol/PAUSE Rule completed. Subsequent Stages Histo Method Verbiage: Using a similar technique to that described above, a thin layer of tissue was removed from all areas where tumor was visible on the previous stage.  The tissue was again oriented, mapped, dyed, and processed as above. Donor Site Anesthesia Type: same as repair anesthesia Spiral Flap Text: The defect edges were debeveled with a #15 scalpel blade.  Given the location of the defect, shape of the defect and the proximity to free margins a spiral flap was deemed most appropriate.  Using a sterile surgical marker, an appropriate rotation flap was drawn incorporating the defect and placing the expected incisions within the relaxed skin tension lines where possible. The area thus outlined was incised deep to adipose tissue with a #15 scalpel blade.  The skin margins were undermined to an appropriate distance in all directions utilizing iris scissors. Cheiloplasty (Complex) Text: A decision was made to reconstruct the defect with a  cheiloplasty.  The defect was undermined extensively.  Additional obicularis oris muscle was excised with a 15 blade scalpel.  The defect was converted into a full thickness wedge to facilite a better cosmetic result.  Small vessels were then tied off with 5-0 monocyrl. The obicularis oris, superficial fascia, adipose and dermis were then reapproximated.  After the deeper layers were approximated the epidermis was reapproximated with particular care given to realign the vermilion border. Suture Removal: 7 days Advancement Flap (Single) Text: The defect edges were debeveled with a #15 scalpel blade.  Given the location of the defect and the proximity to free margins a single advancement flap was deemed most appropriate.  Using a sterile surgical marker, an appropriate advancement flap was drawn incorporating the defect and placing the expected incisions within the relaxed skin tension lines where possible.    The area thus outlined was incised deep to adipose tissue with a #15 scalpel blade.  The skin margins were undermined to an appropriate distance in all directions utilizing iris scissors. O-T Plasty Text: The defect edges were debeveled with a #15 scalpel blade.  Given the location of the defect, shape of the defect and the proximity to free margins an O-T plasty was deemed most appropriate.  Using a sterile surgical marker, an appropriate O-T plasty was drawn incorporating the defect and placing the expected incisions within the relaxed skin tension lines where possible.    The area thus outlined was incised deep to adipose tissue with a #15 scalpel blade.  The skin margins were undermined to an appropriate distance in all directions utilizing iris scissors. Suturegard Retention Suture: 2-0 Nylon Rhombic Flap Text: The defect edges were debeveled with a #15 scalpel blade.  Given the location of the defect and the proximity to free margins a rhombic flap was deemed most appropriate.  Using a sterile surgical marker, an appropriate rhombic flap was drawn incorporating the defect.    The area thus outlined was incised deep to adipose tissue with a #15 scalpel blade.  The skin margins were undermined to an appropriate distance in all directions utilizing iris scissors. Area H Indication Text: Tumors in this location are included in Area H (eyelids, eyebrows, nose, lips, chin, ear, pre-auricular, post-auricular, temple, genitalia, hands, feet, ankles and areola).  Tissue conservation is critical in these anatomic locations. Cartilage Graft Text: The defect edges were debeveled with a #15 scalpel blade.  Given the location of the defect, shape of the defect, the fact the defect involved a full thickness cartilage defect a cartilage graft was deemed most appropriate.  An appropriate donor site was identified, cleansed, and anesthetized. The cartilage graft was then harvested and transferred to the recipient site, oriented appropriately and then sutured into place.  The secondary defect was then repaired using a primary closure. A-T Advancement Flap Text: The defect edges were debeveled with a #15 scalpel blade.  Given the location of the defect, shape of the defect and the proximity to free margins an A-T advancement flap was deemed most appropriate.  Using a sterile surgical marker, an appropriate advancement flap was drawn incorporating the defect and placing the expected incisions within the relaxed skin tension lines where possible.    The area thus outlined was incised deep to adipose tissue with a #15 scalpel blade.  The skin margins were undermined to an appropriate distance in all directions utilizing iris scissors. V-Y Plasty Text: The defect edges were debeveled with a #15 scalpel blade.  Given the location of the defect, shape of the defect and the proximity to free margins an V-Y advancement flap was deemed most appropriate.  Using a sterile surgical marker, an appropriate advancement flap was drawn incorporating the defect and placing the expected incisions within the relaxed skin tension lines where possible.    The area thus outlined was incised deep to adipose tissue with a #15 scalpel blade.  The skin margins were undermined to an appropriate distance in all directions utilizing iris scissors. Length To Time In Minutes Device Was In Place: 10 Postop Diagnosis: same Bilateral Helical Rim Advancement Flap Text: The defect edges were debeveled with a #15 blade scalpel.  Given the location of the defect and the proximity to free margins (helical rim) a bilateral helical rim advancement flap was deemed most appropriate.  Using a sterile surgical marker, the appropriate advancement flaps were drawn incorporating the defect and placing the expected incisions between the helical rim and antihelix where possible.  The area thus outlined was incised through and through with a #15 scalpel blade.  With a skin hook and iris scissors, the flaps were gently and sharply undermined and freed up. Skin Substitute Text: The defect edges were debeveled with a #15 scalpel blade.  Given the location of the defect, shape of the defect and the proximity to free margins a skin substitute graft was deemed most appropriate.  The graft material was trimmed to fit the size of the defect. The graft was then placed in the primary defect and oriented appropriately. Consent 2/Introductory Paragraph: Mohs surgery was explained to the patient and consent was obtained. The risks, benefits and alternatives to therapy were discussed in detail. Specifically, the risks of infection, scarring, bleeding, prolonged wound healing, incomplete removal, allergy to anesthesia, nerve injury and recurrence were addressed. Prior to the procedure, the treatment site was clearly identified and confirmed by the patient. All components of Universal Protocol/PAUSE Rule completed. Double O-Z Flap Text: The defect edges were debeveled with a #15 scalpel blade.  Given the location of the defect, shape of the defect and the proximity to free margins a Double O-Z flap was deemed most appropriate.  Using a sterile surgical marker, an appropriate transposition flap was drawn incorporating the defect and placing the expected incisions within the relaxed skin tension lines where possible. The area thus outlined was incised deep to adipose tissue with a #15 scalpel blade.  The skin margins were undermined to an appropriate distance in all directions utilizing iris scissors. Cheek Interpolation Flap Text: A decision was made to reconstruct the defect utilizing an interpolation axial flap and a staged reconstruction.  A telfa template was made of the defect.  This telfa template was then used to outline the Cheek Interpolation flap.  The donor area for the pedicle flap was then injected with anesthesia.  The flap was excised through the skin and subcutaneous tissue down to the layer of the underlying musculature.  The interpolation flap was carefully excised within this deep plane to maintain its blood supply.  The edges of the donor site were undermined.   The donor site was closed in a primary fashion.  The pedicle was then rotated into position and sutured.  Once the tube was sutured into place, adequate blood supply was confirmed with blanching and refill.  The pedicle was then wrapped with xeroform gauze and dressed appropriately with a telfa and gauze bandage to ensure continued blood supply and protect the attached pedicle.

## 2022-03-25 NOTE — ED PROVIDER NOTE - PHYSICAL EXAMINATION
VITAL SIGNS: I have reviewed nursing notes and confirm.  CONSTITUTIONAL: Well-developed; well-nourished; in no acute distress.   SKIN:  warm and dry, no acute rash.   HEAD:  normocephalic, atraumatic.  EYES: PERRL, EOM intact; conjunctiva and sclera clear.  ENT: No nasal discharge; airway clear.   NECK:  c collar in place, steristrips/incision c/d/i  CARD: S1, S2 normal; no murmurs, gallops, or rubs. Regular rate and rhythm.   RESP:  Clear to auscultation b/l, no wheezes, rales or rhonchi.  ABD: Normal bowel sounds; soft; non-distended; non-tender; no guarding/ rebound.  MSK: Normal ROM. No clubbing, cyanosis or edema. no ttp bilat le  NEURO: Alert, oriented, grossly unremarkable  PSYCH: Cooperative, mood and affect appropriate.

## 2022-03-25 NOTE — ED PROVIDER NOTE - CLINICAL SUMMARY MEDICAL DECISION MAKING FREE TEXT BOX
Pt c/o dizziness, black stool, + orthostatic and guaiac pos at PMD's office.  Sx c/w ugib.  VS w slightly low bp that improved w ivf.  Hgb 7.2 (down from 13.1) - rbc ordered 2/2 sx.  Pt discussed w Dr Lindsey - will admit if declined by icu and will also follow as pt's gi; agrees w current mgmt.  ICU consulted and will eval for poss step down bed.

## 2022-03-25 NOTE — CONSULT NOTE ADULT - ASSESSMENT
Patient is a 75 yo male with recent spinal stenosis surgery, HLD, urinary retention, who presents with dizziness, found to have new drop in hemoglobin, elevated BUN recent aspirin use, melanotic stools, admitted for concern for upper GI bleed    #GI bleed  Patient presents with 5 days of black stools, dizziness, and recent hemoglobin drop from 13-->7.1 in ~2 weeks time, elevated BUN, guaiac positive in outpatient doctor, likely secondary to GI bleed. DDx for cause of bleed aspirin use vs H pylori. No NSAID use, alcohol, liver dysfunction, herbs/supplement use, abnormal cscope.  -s/p 1L NS and 1 unit PRBC  -GI consulted, Dr. Lindsey to perform upper endoscopy 3/25  -continue PPI gtt  -cbc q6h  -f/u GI recs/scope results    #urinary retention  Patient recently had urinary retention requiring indwelling almazan, removed yesterday 3/24. Voiding freely  -continue flomax .4mg BID  -if UOP decreases obtain bladder scan    #HLD  On home crestor 10mg  -continue atorvastatin 40mg    F: s/p 1L NS, 1 unit prbc  DVT: scd's, hold AC in the setting of active bleed  Diet: NPO for scope, f/u GI recs  Dispo: medical telemetry

## 2022-03-25 NOTE — H&P ADULT - HISTORY OF PRESENT ILLNESS
** INCOMPLETE **     HPI: Patient is a 77 yo male with recent spinal stenosis surgery, HLD, urinary retention, who presents from PMD with chief complaint of dizziness. Patient had spinal surgery at hospitals where he was subsequently placed on aspirin for DVT ppx. Patient reports that for ~5 days he has been having 1-2 dark black stools daily. Reports taking senna 2 tabs in AM 2 tabs in PM. Denies any nausea, vomiting, diarrhea. Denies any history of NSAID use, alcohol/drug use, liver dysfunction, herbs/supplements, anticoagulation. Last c scope was 4-5 years ago, no abnormalities noted.    ED Course:  Temp 97.6, HR 80s-90s, BP 90s-130s/50s-60s, saturating 97% on RA  Labs significant for 7.2 (13 on 3/14), BUN 45 (10 on 3/14), AST/ALT 44/66 HPI: Patient is a 77 yo male with recent spinal stenosis surgery, HLD, urinary retention, who presents from PMD with chief complaint of dizziness. Patient had spinal surgery at \[facility]\"" where he was subsequently placed on aspirin for DVT ppx. Patient reports that for ~5 days he has been having 1-2 dark black stools daily. Reports taking senna 2 tabs in AM 2 tabs in PM. Denies any nausea, vomiting, diarrhea. Denies any history of NSAID use, alcohol/drug use, liver dysfunction, herbs/supplements, anticoagulation. Last c scope was 4-5 years ago, no abnormalities noted.    ED Course:  Vitals: 97.6 F, HR 85-96, BP /53-67, RR 18(95%) on Room air   Labs: WBC 10.89, Neutrophils 82.9%, Hgb 13.1 -> 7.2, Hct 22, PT 14, INR 1.17, aPTT 25, BUN 45/Cr 0.95, Glu 226, Protein 5.5, AST/ALT 44/66   Imaging: None  Consults: GI  Interventions: 1LNS, 1u pRBC, PPI 80mg IVP x1 HPI: Patient is a 75 yo male with recent spinal stenosis surgery, HLD, urinary retention, who presents from PMD with chief complaint of dizziness. Patient had spinal surgery at [facility] where he was subsequently placed on aspirin for DVT ppx. Patient reports that for ~5 days he has been having 1-2 dark black stools daily. Reports taking senna 2 tabs in AM 2 tabs in PM. Denies any nausea, vomiting, diarrhea. Denies any history of NSAID use, alcohol/drug use, liver dysfunction, herbs/supplements, anticoagulation. Last c scope was 4-5 years ago, no abnormalities noted. This AM, presented to the ED due to severe chest palpitations, felt as though heart was pounding out of chest. Denied SOB, pressure like pain. Also endorsed light headedness, headache, weakness.     ED Course:  Vitals: 97.6 F, HR 85-96, BP /53-67, RR 18(95%) on Room air   Labs: WBC 10.89, Neutrophils 82.9%, Hgb 13.1 -> 7.2, Hct 22, PT 14, INR 1.17, aPTT 25, BUN 45/Cr 0.95, Glu 226, Protein 5.5, AST/ALT 44/66   Imaging: None  Consults: GI  Interventions: 1LNS, 1u pRBC, PPI 80mg IVP x1

## 2022-03-25 NOTE — ED ADULT NURSE NOTE - NSIMPLEMENTINTERV_GEN_ALL_ED
Implemented All Universal Safety Interventions:  [place] to call system. Call bell, personal items and telephone within reach. Instruct patient to call for assistance. Room bathroom lighting operational. Non-slip footwear when patient is off stretcher. Physically safe environment: no spills, clutter or unnecessary equipment. Stretcher in lowest position, wheels locked, appropriate side rails in place. no

## 2022-03-25 NOTE — PROGRESS NOTE ADULT - SUBJECTIVE AND OBJECTIVE BOX
EGD = small hiatal hernia, gastritis, one duodenal ulcer white based, cratered 1 cm in diameter, not bleeding as of exam  Plas: PPI  regular diet  watch overnight

## 2022-03-25 NOTE — PROGRESS NOTE ADULT - SUBJECTIVE AND OBJECTIVE BOX
Patient is a 76y old  Male Hgb 13 on March 14, 2022 Had spine surgery recently and placed on Aspirin 81 mg daily c/o black stool x 7 days once a day saw Dr Mendez for dizziness and found to be orthostatic and heme positive and sent to ED Hgb = 7.3 hence admitted    REVIEW OF SYSTEMS:  Constitutional: No fever, weight loss +fatigue, orthostatic  ENMT:  No difficulty hearing, tinnitus, vertigo; No sinus or throat pain  Respiratory: No cough, wheezing, chills or hemoptysis  Cardiovascular: No chest pain, palpitations, dizziness or leg swelling  Gastrointestinal: No abdominal or epigastric pain. No nausea, vomiting or hematemesis; No diarrhea + melena   Skin: No itching, burning, rashes or lesions   Musculoskeletal: No joint pain or swelling; No muscle, back or extremity pain    PAST MEDICAL & SURGICAL HISTORY:  Spinal stenosis    HLD (hyperlipidemia)    H/O cervical spine surgery        FAMILY HISTORY:      SOCIAL HISTORY:  Smoking Status: [ ] Current, [ ] Former, [ ] Never  Pack Years:    MEDICATIONS:  MEDICATIONS  (STANDING):  pantoprazole Infusion 8 mG/Hr (10 mL/Hr) IV Continuous <Continuous>    MEDICATIONS  (PRN):      Allergies    No Known Allergies    Intolerances        Vital Signs Last 24 Hrs  T(C): 36.3 (25 Mar 2022 13:28), Max: 37 (25 Mar 2022 11:36)  T(F): 97.3 (25 Mar 2022 13:28), Max: 98.6 (25 Mar 2022 11:36)  HR: 87 (25 Mar 2022 13:28) (85 - 96)  BP: 138/56 (25 Mar 2022 13:28) (99/67 - 138/56)  BP(mean): --  RR: 16 (25 Mar 2022 13:28) (16 - 18)  SpO2: 98% (25 Mar 2022 13:28) (97% - 100%)        PHYSICAL EXAM:    General: pale,  in no acute distress  HEENT: MMM, conjunctiva and sclera clear  Lungs: clear  Heart: regular  Gastrointestinal: Soft, non-tender non-distended; Normal bowel sounds; No rebound or guarding  Extremities: Normal range of motion, No clubbing, cyanosis or edema  Neurological: Alert and oriented x3  Skin: Warm and dry. No obvious rash      LABS:                        7.2    10.89 )-----------( 333      ( 25 Mar 2022 11:23 )             22.0     03-25    141  |  108  |  45<H>  ----------------------------<  226<H>  4.3   |  25  |  0.95    Ca    8.6      25 Mar 2022 11:23    TPro  5.5<L>  /  Alb  3.5  /  TBili  0.2  /  DBili  x   /  AST  44<H>  /  ALT  66<H>  /  AlkPhos  62  03-25          RADIOLOGY & ADDITIONAL STUDIES:

## 2022-03-25 NOTE — H&P ADULT - PROBLEM SELECTOR PLAN 5
F: s/p 1L NS, 1 unit pRBC  E: replete K<4, Mg<2  N: regular  VTE Prophylaxis: SCD, hold AC i/s/o GIB  GI: home pepcid  C: Full Code  D: 5Lach F: s/p 1L NS, 1 unit pRBC  E: replete K<4, Mg<2  N: regular  VTE Prophylaxis: SCD, hold AC i/s/o GIB  GI: protonix  C: Full Code  D: 5Lach

## 2022-03-25 NOTE — CONSULT NOTE ADULT - SUBJECTIVE AND OBJECTIVE BOX
Patient is a 76y old  Male who presents with a chief complaint of symptomatic anemia (25 Mar 2022 13:06)      Consult reason: symptomatic anemia 2/2 suspected UGIB    ED vitals: Afebrile, HR 85, 115/59, RR 16, SPO2 99 room air  Labs significant: Hgb 7.2 (1  Imaging/EKG:   Interventions:    HPI:      Allergies    No Known Allergies    Intolerances      Home Medications:  acetaminophen 650 mg oral tablet: 2  orally every 6 hours (25 Mar 2022 11:35)  aspirin 81 mg oral tablet, chewable: 1 tab(s) orally once a day (25 Mar 2022 11:35)  methocarbamol 500 mg oral tablet: orally 3 times a day (25 Mar 2022 11:35)  polyethylene glycol 3350 oral powder for reconstitution:  (25 Mar 2022 11:35)  rosuvastatin 10 mg oral tablet: 1 tab(s) orally once a day (25 Mar 2022 11:35)  tamsulosin 0.4 mg oral capsule: 1 cap(s) orally once a day (25 Mar 2022 11:35)  traMADol 50 mg oral tablet: 1 tab(s) orally every 6 hours (25 Mar 2022 11:35)      SOCIAL HX:     Smoking          ETOH/Illicit drugs          Occupation    PAST MEDICAL & SURGICAL HISTORY:  Spinal stenosis    HLD (hyperlipidemia)    H/O cervical spine surgery        FAMILY HISTORY:  :    No known cardiovascular or pulmonary family history     ROS:  See HPI     PHYSICAL EXAM    ICU Vital Signs Last 24 Hrs  T(C): 37 (25 Mar 2022 11:36), Max: 37 (25 Mar 2022 11:36)  T(F): 98.6 (25 Mar 2022 11:36), Max: 98.6 (25 Mar 2022 11:36)  HR: 87 (25 Mar 2022 12:05) (85 - 96)  BP: 115/59 (25 Mar 2022 12:05) (99/67 - 123/59)  BP(mean): --  ABP: --  ABP(mean): --  RR: 16 (25 Mar 2022 12:05) (16 - 18)  SpO2: 99% (25 Mar 2022 12:05) (97% - 100%)      General: Not in distress  HEENT:  RACHEL              Lymphatic system: No LN  Lungs: Bilateral BS  Cardiovascular: Regular  Gastrointestinal: Soft, Positive BS  Musculoskeletal: No clubbing.  Moves all extremities.    Skin: Warm.  Intact  Neurological: No motor or sensory deficit         LABS:                          7.2    10.89 )-----------( 333      ( 25 Mar 2022 11:23 )             22.0                                               03-25    141  |  108  |  45<H>  ----------------------------<  226<H>  4.3   |  25  |  0.95    Ca    8.6      25 Mar 2022 11:23    TPro  5.5<L>  /  Alb  3.5  /  TBili  0.2  /  DBili  x   /  AST  44<H>  /  ALT  66<H>  /  AlkPhos  62  03-25      PT/INR - ( 25 Mar 2022 11:23 )   PT: 14.0 sec;   INR: 1.17          PTT - ( 25 Mar 2022 11:23 )  PTT:25.0 sec                                                                                     LIVER FUNCTIONS - ( 25 Mar 2022 11:23 )  Alb: 3.5 g/dL / Pro: 5.5 g/dL / ALK PHOS: 62 U/L / ALT: 66 U/L / AST: 44 U/L / GGT: x                                                                                                                                           CXR:    ECHO:    MEDICATIONS  (STANDING):  pantoprazole Infusion 8 mG/Hr (10 mL/Hr) IV Continuous <Continuous>    MEDICATIONS  (PRN):         HPI: Patient is a 77 yo male with recent spinal stenosis surgery, HLD, urinary retention, who presents from PMD with chief complaint of dizziness. Patient had spinal surgery at [facility] where he was subsequently placed on aspirin for DVT ppx. Patient reports that for ~5 days he has been having 1-2 dark black stools daily. Reports taking senna 2 tabs in AM 2 tabs in PM. Denies any nausea, vomiting, diarrhea. Denies any history of NSAID use, alcohol/drug use, liver dysfunction, herbs/supplements, anticoagulation. Last c scope was 4-5 years ago, no abnormalities       Allergies    No Known Allergies    Intolerances      Home Medications:  acetaminophen 650 mg oral tablet: 2  orally every 6 hours (25 Mar 2022 11:35)  aspirin 81 mg oral tablet, chewable: 1 tab(s) orally once a day (25 Mar 2022 11:35)  methocarbamol 500 mg oral tablet: orally 3 times a day (25 Mar 2022 11:35)  polyethylene glycol 3350 oral powder for reconstitution:  (25 Mar 2022 11:35)  rosuvastatin 10 mg oral tablet: 1 tab(s) orally once a day (25 Mar 2022 11:35)  tamsulosin 0.4 mg oral capsule: 1 cap(s) orally once a day (25 Mar 2022 11:35)  traMADol 50 mg oral tablet: 1 tab(s) orally every 6 hours (25 Mar 2022 11:35)      SOCIAL HX:     Smoking          ETOH/Illicit drugs          Occupation    PAST MEDICAL & SURGICAL HISTORY:  Spinal stenosis    HLD (hyperlipidemia)    H/O cervical spine surgery        FAMILY HISTORY:  :    No known cardiovascular or pulmonary family history     ROS:  See HPI     PHYSICAL EXAM    ICU Vital Signs Last 24 Hrs  T(C): 37 (25 Mar 2022 11:36), Max: 37 (25 Mar 2022 11:36)  T(F): 98.6 (25 Mar 2022 11:36), Max: 98.6 (25 Mar 2022 11:36)  HR: 87 (25 Mar 2022 12:05) (85 - 96)  BP: 115/59 (25 Mar 2022 12:05) (99/67 - 123/59)  BP(mean): --  ABP: --  ABP(mean): --  RR: 16 (25 Mar 2022 12:05) (16 - 18)  SpO2: 99% (25 Mar 2022 12:05) (97% - 100%)      General: Not in distress  HEENT:  RACHEL              Lymphatic system: No LN  Lungs: Bilateral BS  Cardiovascular: Regular  Gastrointestinal: Soft, Positive BS  Musculoskeletal: No clubbing.  Moves all extremities.    Skin: Warm.  Intact  Neurological: No motor or sensory deficit         LABS:                          7.2    10.89 )-----------( 333      ( 25 Mar 2022 11:23 )             22.0                                               03-25    141  |  108  |  45<H>  ----------------------------<  226<H>  4.3   |  25  |  0.95    Ca    8.6      25 Mar 2022 11:23    TPro  5.5<L>  /  Alb  3.5  /  TBili  0.2  /  DBili  x   /  AST  44<H>  /  ALT  66<H>  /  AlkPhos  62  03-25      PT/INR - ( 25 Mar 2022 11:23 )   PT: 14.0 sec;   INR: 1.17          PTT - ( 25 Mar 2022 11:23 )  PTT:25.0 sec                                                                                     LIVER FUNCTIONS - ( 25 Mar 2022 11:23 )  Alb: 3.5 g/dL / Pro: 5.5 g/dL / ALK PHOS: 62 U/L / ALT: 66 U/L / AST: 44 U/L / GGT: x                                                                                                                                           CXR:    ECHO:    MEDICATIONS  (STANDING):  pantoprazole Infusion 8 mG/Hr (10 mL/Hr) IV Continuous <Continuous>    MEDICATIONS  (PRN):         HPI: Patient is a 75 yo male with recent spinal stenosis surgery, HLD, urinary retention, who presents from PMD with chief complaint of dizziness. Patient had spinal surgery at [facility] where he was subsequently placed on aspirin for DVT ppx. Patient reports that for ~5 days he has been having 1-2 dark black stools daily. Reports taking senna 2 tabs in AM 2 tabs in PM. Denies any nausea, vomiting, diarrhea. Denies any history of NSAID use, alcohol/drug use, liver dysfunction, herbs/supplements, anticoagulation. Last c scope was 4-5 years ago, no abnormalities noted.    ED Course:  Temp 97.6, HR 80s-90s,        Allergies    No Known Allergies    Intolerances      Home Medications:  acetaminophen 650 mg oral tablet: 2  orally every 6 hours (25 Mar 2022 11:35)  aspirin 81 mg oral tablet, chewable: 1 tab(s) orally once a day (25 Mar 2022 11:35)  methocarbamol 500 mg oral tablet: orally 3 times a day (25 Mar 2022 11:35)  polyethylene glycol 3350 oral powder for reconstitution:  (25 Mar 2022 11:35)  rosuvastatin 10 mg oral tablet: 1 tab(s) orally once a day (25 Mar 2022 11:35)  tamsulosin 0.4 mg oral capsule: 1 cap(s) orally once a day (25 Mar 2022 11:35)  traMADol 50 mg oral tablet: 1 tab(s) orally every 6 hours (25 Mar 2022 11:35)      SOCIAL HX:     Smoking          ETOH/Illicit drugs          Occupation    PAST MEDICAL & SURGICAL HISTORY:  Spinal stenosis    HLD (hyperlipidemia)    H/O cervical spine surgery        FAMILY HISTORY:  :    No known cardiovascular or pulmonary family history     ROS:  See HPI     PHYSICAL EXAM    ICU Vital Signs Last 24 Hrs  T(C): 37 (25 Mar 2022 11:36), Max: 37 (25 Mar 2022 11:36)  T(F): 98.6 (25 Mar 2022 11:36), Max: 98.6 (25 Mar 2022 11:36)  HR: 87 (25 Mar 2022 12:05) (85 - 96)  BP: 115/59 (25 Mar 2022 12:05) (99/67 - 123/59)  BP(mean): --  ABP: --  ABP(mean): --  RR: 16 (25 Mar 2022 12:05) (16 - 18)  SpO2: 99% (25 Mar 2022 12:05) (97% - 100%)      General: Not in distress  HEENT:  RACHEL              Lymphatic system: No LN  Lungs: Bilateral BS  Cardiovascular: Regular  Gastrointestinal: Soft, Positive BS  Musculoskeletal: No clubbing.  Moves all extremities.    Skin: Warm.  Intact  Neurological: No motor or sensory deficit         LABS:                          7.2    10.89 )-----------( 333      ( 25 Mar 2022 11:23 )             22.0                                               03-25    141  |  108  |  45<H>  ----------------------------<  226<H>  4.3   |  25  |  0.95    Ca    8.6      25 Mar 2022 11:23    TPro  5.5<L>  /  Alb  3.5  /  TBili  0.2  /  DBili  x   /  AST  44<H>  /  ALT  66<H>  /  AlkPhos  62  03-25      PT/INR - ( 25 Mar 2022 11:23 )   PT: 14.0 sec;   INR: 1.17          PTT - ( 25 Mar 2022 11:23 )  PTT:25.0 sec                                                                                     LIVER FUNCTIONS - ( 25 Mar 2022 11:23 )  Alb: 3.5 g/dL / Pro: 5.5 g/dL / ALK PHOS: 62 U/L / ALT: 66 U/L / AST: 44 U/L / GGT: x                                                                                                                                           CXR:    ECHO:    MEDICATIONS  (STANDING):  pantoprazole Infusion 8 mG/Hr (10 mL/Hr) IV Continuous <Continuous>    MEDICATIONS  (PRN):         HPI: Patient is a 75 yo male with recent spinal stenosis surgery, HLD, urinary retention, who presents from PMD with chief complaint of dizziness. Patient had spinal surgery at [facility] where he was subsequently placed on aspirin for DVT ppx. Patient reports that for ~5 days he has been having 1-2 dark black stools daily. Reports taking senna 2 tabs in AM 2 tabs in PM. Denies any nausea, vomiting, diarrhea. Denies any history of NSAID use, alcohol/drug use, liver dysfunction, herbs/supplements, anticoagulation. Last c scope was 4-5 years ago, no abnormalities noted.    ED Course:  Temp 97.6, HR 80s-90s, BP 90s-130s/50s-60s, saturating 97% on RA  Labs significant for 7.2 (13 on 3/14), BUN 45 (10 on 3/14), AST/ALT 44/66    Allergies    No Known Allergies    Intolerances    Home Medications:  aspirin 81 mg oral tablet, chewable: 1 tab(s) orally once a day (25 Mar 2022 11:35)  rosuvastatin 10 mg oral tablet: 1 tab(s) orally once a day (25 Mar 2022 11:35)  tamsulosin 0.4 mg oral capsule: 1 cap(s) orally twice a day (25 Mar 2022 11:35)  Vitamin D3, B12    SOCIAL HX:     Smoking          ETOH/Illicit drugs          Occupation    PAST MEDICAL & SURGICAL HISTORY:  Spinal stenosis    HLD (hyperlipidemia)    H/O cervical spine surgery    No known cardiovascular or pulmonary family history     PHYSICAL EXAM    ICU Vital Signs Last 24 Hrs  T(C): 37 (25 Mar 2022 11:36), Max: 37 (25 Mar 2022 11:36)  T(F): 98.6 (25 Mar 2022 11:36), Max: 98.6 (25 Mar 2022 11:36)  HR: 87 (25 Mar 2022 12:05) (85 - 96)  BP: 115/59 (25 Mar 2022 12:05) (99/67 - 123/59)  BP(mean): --  ABP: --  ABP(mean): --  RR: 16 (25 Mar 2022 12:05) (16 - 18)  SpO2: 99% (25 Mar 2022 12:05) (97% - 100%)      General: Not in distress  HEENT: no scleral icteris           Lungs: Bilateral BS  Cardiovascular: RRR, no m/r/g  Gastrointestinal: Soft, NTND;   Neurological: alert and oriented x4  Extremities: warm, well perfused, no edema    LABS:                          7.2    10.89 )-----------( 333      ( 25 Mar 2022 11:23 )             22.0                                               03-25    141  |  108  |  45<H>  ----------------------------<  226<H>  4.3   |  25  |  0.95    Ca    8.6      25 Mar 2022 11:23    TPro  5.5<L>  /  Alb  3.5  /  TBili  0.2  /  DBili  x   /  AST  44<H>  /  ALT  66<H>  /  AlkPhos  62  03-25      PT/INR - ( 25 Mar 2022 11:23 )   PT: 14.0 sec;   INR: 1.17          PTT - ( 25 Mar 2022 11:23 )  PTT:25.0 sec                                                                                     LIVER FUNCTIONS - ( 25 Mar 2022 11:23 )  Alb: 3.5 g/dL / Pro: 5.5 g/dL / ALK PHOS: 62 U/L / ALT: 66 U/L / AST: 44 U/L / GGT: x                                              MEDICATIONS  (STANDING):  pantoprazole Infusion 8 mG/Hr (10 mL/Hr) IV Continuous <Continuous>

## 2022-03-25 NOTE — H&P ADULT - NSHPPHYSICALEXAM_GEN_ALL_CORE
T(C): 36.3 (03-25-22 @ 13:28), Max: 37 (03-25-22 @ 11:36)  HR: 87 (03-25-22 @ 13:28) (85 - 96)  BP: 138/56 (03-25-22 @ 13:28) (99/67 - 138/56)  RR: 16 (03-25-22 @ 13:28) (16 - 18)  SpO2: 98% (03-25-22 @ 13:28) (97% - 100%)    General: NAD, laying in bed, speaking in full sentences  HEENT: head NC/AT, no conjunctival injection, EOMI, MMM  Neck: supple, no JVD  Cardio: RRR, +S1/S2, no M/R/G  Resp: lungs CTAB, no cough/wheezes/rales/rhonchi  Abdo: soft, NT, ND, +bowel sounds x4, no organomegaly or palpable mass    Extremities: WWP, no edema/cyanosis/clubbing   Vasc: 2+ radial and DP pulses b/l  Neuro: A&Ox3  Psych: speech non-pressured, thoughts goal-oriented  Skin: dry, intact, no visible jaundice   MSK: no joint swelling T(C): 36.3 (03-25-22 @ 13:28), Max: 37 (03-25-22 @ 11:36)  HR: 87 (03-25-22 @ 13:28) (85 - 96)  BP: 138/56 (03-25-22 @ 13:28) (99/67 - 138/56)  RR: 16 (03-25-22 @ 13:28) (16 - 18)  SpO2: 98% (03-25-22 @ 13:28) (97% - 100%)    General: NAD, laying in bed, speaking in full sentences  HEENT: head NC/AT, no conjunctival injection, EOMI, pallor.   Neck: supple, no JVD, aspen collar in place.   Cardio: RRR, +S1/S2, no M/R/G  Resp: lungs CTAB, no cough/wheezes/rales/rhonchi  Abdo: soft, NT, ND, +bowel sounds x4, no organomegaly or palpable mass    Extremities: WWP, no edema/cyanosis/clubbing   Vasc: 2+ radial and DP pulses b/l  Neuro: A&Ox3  Psych: speech non-pressured, thoughts goal-oriented  Skin: dry, intact, no visible jaundice   MSK: no joint swelling

## 2022-03-26 LAB
A1C WITH ESTIMATED AVERAGE GLUCOSE RESULT: 5.8 % — HIGH (ref 4–5.6)
ANION GAP SERPL CALC-SCNC: 8 MMOL/L — SIGNIFICANT CHANGE UP (ref 5–17)
BASOPHILS # BLD AUTO: 0.02 K/UL — SIGNIFICANT CHANGE UP (ref 0–0.2)
BASOPHILS # BLD AUTO: 0.02 K/UL — SIGNIFICANT CHANGE UP (ref 0–0.2)
BASOPHILS NFR BLD AUTO: 0.2 % — SIGNIFICANT CHANGE UP (ref 0–2)
BASOPHILS NFR BLD AUTO: 0.3 % — SIGNIFICANT CHANGE UP (ref 0–2)
BUN SERPL-MCNC: 27 MG/DL — HIGH (ref 7–23)
CALCIUM SERPL-MCNC: 8.6 MG/DL — SIGNIFICANT CHANGE UP (ref 8.4–10.5)
CHLORIDE SERPL-SCNC: 110 MMOL/L — HIGH (ref 96–108)
CO2 SERPL-SCNC: 24 MMOL/L — SIGNIFICANT CHANGE UP (ref 22–31)
CREAT SERPL-MCNC: 0.87 MG/DL — SIGNIFICANT CHANGE UP (ref 0.5–1.3)
EGFR: 90 ML/MIN/1.73M2 — SIGNIFICANT CHANGE UP
EOSINOPHIL # BLD AUTO: 0.17 K/UL — SIGNIFICANT CHANGE UP (ref 0–0.5)
EOSINOPHIL # BLD AUTO: 0.23 K/UL — SIGNIFICANT CHANGE UP (ref 0–0.5)
EOSINOPHIL NFR BLD AUTO: 2.2 % — SIGNIFICANT CHANGE UP (ref 0–6)
EOSINOPHIL NFR BLD AUTO: 2.8 % — SIGNIFICANT CHANGE UP (ref 0–6)
ESTIMATED AVERAGE GLUCOSE: 120 MG/DL — HIGH (ref 68–114)
GLUCOSE BLDC GLUCOMTR-MCNC: 120 MG/DL — HIGH (ref 70–99)
GLUCOSE BLDC GLUCOMTR-MCNC: 130 MG/DL — HIGH (ref 70–99)
GLUCOSE BLDC GLUCOMTR-MCNC: 166 MG/DL — HIGH (ref 70–99)
GLUCOSE SERPL-MCNC: 187 MG/DL — HIGH (ref 70–99)
HCT VFR BLD CALC: 21.8 % — LOW (ref 39–50)
HCT VFR BLD CALC: 21.8 % — LOW (ref 39–50)
HCT VFR BLD CALC: 24.1 % — LOW (ref 39–50)
HCV AB S/CO SERPL IA: 0.04 S/CO — SIGNIFICANT CHANGE UP
HCV AB SERPL-IMP: SIGNIFICANT CHANGE UP
HGB BLD-MCNC: 7 G/DL — CRITICAL LOW (ref 13–17)
HGB BLD-MCNC: 7 G/DL — CRITICAL LOW (ref 13–17)
HGB BLD-MCNC: 8 G/DL — LOW (ref 13–17)
IMM GRANULOCYTES NFR BLD AUTO: 0.7 % — SIGNIFICANT CHANGE UP (ref 0–1.5)
IMM GRANULOCYTES NFR BLD AUTO: 0.7 % — SIGNIFICANT CHANGE UP (ref 0–1.5)
LYMPHOCYTES # BLD AUTO: 1.49 K/UL — SIGNIFICANT CHANGE UP (ref 1–3.3)
LYMPHOCYTES # BLD AUTO: 1.5 K/UL — SIGNIFICANT CHANGE UP (ref 1–3.3)
LYMPHOCYTES # BLD AUTO: 17.9 % — SIGNIFICANT CHANGE UP (ref 13–44)
LYMPHOCYTES # BLD AUTO: 19.7 % — SIGNIFICANT CHANGE UP (ref 13–44)
MAGNESIUM SERPL-MCNC: 2.1 MG/DL — SIGNIFICANT CHANGE UP (ref 1.6–2.6)
MCHC RBC-ENTMCNC: 29.3 PG — SIGNIFICANT CHANGE UP (ref 27–34)
MCHC RBC-ENTMCNC: 29.4 PG — SIGNIFICANT CHANGE UP (ref 27–34)
MCHC RBC-ENTMCNC: 29.6 PG — SIGNIFICANT CHANGE UP (ref 27–34)
MCHC RBC-ENTMCNC: 32.1 GM/DL — SIGNIFICANT CHANGE UP (ref 32–36)
MCHC RBC-ENTMCNC: 32.1 GM/DL — SIGNIFICANT CHANGE UP (ref 32–36)
MCHC RBC-ENTMCNC: 33.2 GM/DL — SIGNIFICANT CHANGE UP (ref 32–36)
MCV RBC AUTO: 89.3 FL — SIGNIFICANT CHANGE UP (ref 80–100)
MCV RBC AUTO: 91.2 FL — SIGNIFICANT CHANGE UP (ref 80–100)
MCV RBC AUTO: 91.6 FL — SIGNIFICANT CHANGE UP (ref 80–100)
MONOCYTES # BLD AUTO: 0.42 K/UL — SIGNIFICANT CHANGE UP (ref 0–0.9)
MONOCYTES # BLD AUTO: 0.57 K/UL — SIGNIFICANT CHANGE UP (ref 0–0.9)
MONOCYTES NFR BLD AUTO: 5.1 % — SIGNIFICANT CHANGE UP (ref 2–14)
MONOCYTES NFR BLD AUTO: 7.5 % — SIGNIFICANT CHANGE UP (ref 2–14)
NEUTROPHILS # BLD AUTO: 5.29 K/UL — SIGNIFICANT CHANGE UP (ref 1.8–7.4)
NEUTROPHILS # BLD AUTO: 6.09 K/UL — SIGNIFICANT CHANGE UP (ref 1.8–7.4)
NEUTROPHILS NFR BLD AUTO: 69.6 % — SIGNIFICANT CHANGE UP (ref 43–77)
NEUTROPHILS NFR BLD AUTO: 73.3 % — SIGNIFICANT CHANGE UP (ref 43–77)
NRBC # BLD: 0 /100 WBCS — SIGNIFICANT CHANGE UP (ref 0–0)
PHOSPHATE SERPL-MCNC: 2.2 MG/DL — LOW (ref 2.5–4.5)
PLATELET # BLD AUTO: 277 K/UL — SIGNIFICANT CHANGE UP (ref 150–400)
PLATELET # BLD AUTO: 287 K/UL — SIGNIFICANT CHANGE UP (ref 150–400)
PLATELET # BLD AUTO: 304 K/UL — SIGNIFICANT CHANGE UP (ref 150–400)
POTASSIUM SERPL-MCNC: 4 MMOL/L — SIGNIFICANT CHANGE UP (ref 3.5–5.3)
POTASSIUM SERPL-SCNC: 4 MMOL/L — SIGNIFICANT CHANGE UP (ref 3.5–5.3)
RBC # BLD: 2.38 M/UL — LOW (ref 4.2–5.8)
RBC # BLD: 2.39 M/UL — LOW (ref 4.2–5.8)
RBC # BLD: 2.7 M/UL — LOW (ref 4.2–5.8)
RBC # FLD: 15.4 % — HIGH (ref 10.3–14.5)
RBC # FLD: 15.6 % — HIGH (ref 10.3–14.5)
RBC # FLD: 15.9 % — HIGH (ref 10.3–14.5)
RETICS #: 84.3 K/UL — SIGNIFICANT CHANGE UP (ref 25–125)
RETICS/RBC NFR: 3.5 % — HIGH (ref 0.5–2.5)
SODIUM SERPL-SCNC: 142 MMOL/L — SIGNIFICANT CHANGE UP (ref 135–145)
WBC # BLD: 7.6 K/UL — SIGNIFICANT CHANGE UP (ref 3.8–10.5)
WBC # BLD: 8.19 K/UL — SIGNIFICANT CHANGE UP (ref 3.8–10.5)
WBC # BLD: 9.6 K/UL — SIGNIFICANT CHANGE UP (ref 3.8–10.5)
WBC # FLD AUTO: 7.6 K/UL — SIGNIFICANT CHANGE UP (ref 3.8–10.5)
WBC # FLD AUTO: 8.19 K/UL — SIGNIFICANT CHANGE UP (ref 3.8–10.5)
WBC # FLD AUTO: 9.6 K/UL — SIGNIFICANT CHANGE UP (ref 3.8–10.5)

## 2022-03-26 PROCEDURE — 99232 SBSQ HOSP IP/OBS MODERATE 35: CPT | Mod: GC

## 2022-03-26 RX ORDER — IRON SUCROSE 20 MG/ML
200 INJECTION, SOLUTION INTRAVENOUS ONCE
Refills: 0 | Status: COMPLETED | OUTPATIENT
Start: 2022-03-26 | End: 2022-03-26

## 2022-03-26 RX ORDER — IRON SUCROSE 20 MG/ML
200 INJECTION, SOLUTION INTRAVENOUS EVERY 24 HOURS
Refills: 0 | Status: DISCONTINUED | OUTPATIENT
Start: 2022-03-27 | End: 2022-03-28

## 2022-03-26 RX ADMIN — TAMSULOSIN HYDROCHLORIDE 0.4 MILLIGRAM(S): 0.4 CAPSULE ORAL at 05:26

## 2022-03-26 RX ADMIN — ATORVASTATIN CALCIUM 40 MILLIGRAM(S): 80 TABLET, FILM COATED ORAL at 21:12

## 2022-03-26 RX ADMIN — Medication 2: at 11:49

## 2022-03-26 RX ADMIN — PANTOPRAZOLE SODIUM 40 MILLIGRAM(S): 20 TABLET, DELAYED RELEASE ORAL at 05:27

## 2022-03-26 RX ADMIN — TAMSULOSIN HYDROCHLORIDE 0.4 MILLIGRAM(S): 0.4 CAPSULE ORAL at 17:44

## 2022-03-26 RX ADMIN — IRON SUCROSE 110 MILLIGRAM(S): 20 INJECTION, SOLUTION INTRAVENOUS at 11:28

## 2022-03-26 RX ADMIN — PANTOPRAZOLE SODIUM 40 MILLIGRAM(S): 20 TABLET, DELAYED RELEASE ORAL at 17:43

## 2022-03-26 NOTE — PROVIDER CONTACT NOTE (OTHER) - SITUATION
pt admitted with UGIB, had bm with bright red streak of blood notred.  PT urinated 250cc post void residual 155

## 2022-03-26 NOTE — PROGRESS NOTE ADULT - SUBJECTIVE AND OBJECTIVE BOX
Pt seen and examined   NO COMPLAINTS  WALKED TO BATHROOM AND BCK AND DID NOT FEEL DIZZY  had one BM but he did not look at it and flushed    REVIEW OF SYSTEMS:  Constitutional: No fever, weight loss or fatigue  Cardiovascular: No chest pain, palpitations, dizziness or leg swelling  Gastrointestinal: No abdominal or epigastric pain. No nausea, vomiting or hematemesis;.  Skin: No itching, burning, rashes or lesions       MEDICATIONS:  MEDICATIONS  (STANDING):  atorvastatin 40 milliGRAM(s) Oral at bedtime  dextrose 5%. 1000 milliLiter(s) (50 mL/Hr) IV Continuous <Continuous>  dextrose 5%. 1000 milliLiter(s) (100 mL/Hr) IV Continuous <Continuous>  dextrose 50% Injectable 25 Gram(s) IV Push once  dextrose 50% Injectable 12.5 Gram(s) IV Push once  dextrose 50% Injectable 25 Gram(s) IV Push once  glucagon  Injectable 1 milliGRAM(s) IntraMuscular once  insulin lispro (ADMELOG) corrective regimen sliding scale   SubCutaneous three times a day before meals  iron sucrose IVPB 200 milliGRAM(s) IV Intermittent once  pantoprazole  Injectable 40 milliGRAM(s) IV Push every 12 hours  tamsulosin 0.4 milliGRAM(s) Oral every 12 hours    MEDICATIONS  (PRN):  dextrose Oral Gel 15 Gram(s) Oral once PRN Blood Glucose LESS THAN 70 milliGRAM(s)/deciliter      Allergies    No Known Allergies    Intolerances        Vital Signs Last 24 Hrs  T(C): 36.9 (26 Mar 2022 09:19), Max: 37 (25 Mar 2022 11:36)  T(F): 98.5 (26 Mar 2022 09:19), Max: 98.6 (25 Mar 2022 11:36)  HR: 105 (26 Mar 2022 09:41) (85 - 105)  BP: 136/63 (26 Mar 2022 09:41) (111/56 - 138/56)  BP(mean): 91 (26 Mar 2022 09:41) (79 - 91)  RR: 17 (26 Mar 2022 09:41) (16 - 18)  SpO2: 98% (26 Mar 2022 09:41) (96% - 99%)    03-25 @ 07:01  -  03-26 @ 07:00  --------------------------------------------------------  IN: 480 mL / OUT: 575 mL / NET: -95 mL    03-26 @ 07:01  -  03-26 @ 11:04  --------------------------------------------------------  IN: 180 mL / OUT: 400 mL / NET: -220 mL        PHYSICAL EXAM:    General: in no acute distress  HEENT: MMM, conjunctiva and sclera clear  Gastrointestinal: Soft non-tender non-distended; Normal bowel sounds;   Skin: Warm and dry. No obvious rash    LABS:      CBC Full  -  ( 26 Mar 2022 10:40 )  WBC Count : 8.19 K/uL  RBC Count : 2.39 M/uL  Hemoglobin : 7.0 g/dL  Hematocrit : 21.8 %  Platelet Count - Automated : 277 K/uL  Mean Cell Volume : 91.2 fl  Mean Cell Hemoglobin : 29.3 pg  Mean Cell Hemoglobin Concentration : 32.1 gm/dL  Auto Neutrophil # : x  Auto Lymphocyte # : x  Auto Monocyte # : x  Auto Eosinophil # : x  Auto Basophil # : x  Auto Neutrophil % : x  Auto Lymphocyte % : x  Auto Monocyte % : x  Auto Eosinophil % : x  Auto Basophil % : x    03-25    142  |  110<H>  |  34<H>  ----------------------------<  160<H>  3.9   |  24  |  0.90    Ca    8.7      25 Mar 2022 19:19    TPro  5.5<L>  /  Alb  3.5  /  TBili  0.2  /  DBili  x   /  AST  44<H>  /  ALT  66<H>  /  AlkPhos  62  03-25    PT/INR - ( 25 Mar 2022 11:23 )   PT: 14.0 sec;   INR: 1.17          PTT - ( 25 Mar 2022 11:23 )  PTT:25.0 sec                  RADIOLOGY & ADDITIONAL STUDIES (The following images were personally reviewed):

## 2022-03-26 NOTE — PROGRESS NOTE ADULT - PROBLEM SELECTOR PLAN 1
Patient presents with 5 days of black stools, dizziness, and recent hemoglobin drop from 13-->7.1 in ~2 weeks time, elevated BUN, guaiac positive in outpatient doctor, likely secondary to GI bleed. DDx for cause of bleed aspirin use vs H pylori. No NSAID use, alcohol, liver dysfunction, herbs/supplement use, abnormal cscope.  -s/p 1L NS and 1U pRBC on 3/25 evening with hgb 7.2-->7.5   - GI consulted, Dr. Lindsey performed EGD (3/25) = small hiatal hernia, gastritis, one duodenal ulcer white based, cratered 1 cm in diameter, no active signs of bleeding   - c/w IV PPI BID  - advance to regular diet  - Hgb 7.0 this 3/26 am, unsure if truly bleeding because endoscopy showed no bleed and he is no longer dizzy and walked to bathroom, will repeat CBC, give iron infusion Patient presents with 5 days of black stools, dizziness, and recent hemoglobin drop from 13-->7.1 in ~2 weeks time, elevated BUN, guaiac positive in outpatient doctor, likely secondary to GI bleed. DDx for cause of bleed aspirin use vs H pylori. No NSAID use, alcohol, liver dysfunction, herbs/supplement use, abnormal cscope.  -s/p 1L NS and 1U pRBC on 3/25 evening with hgb 7.2-->7.5   - GI consulted, Dr. Lindsey performed EGD (3/25) = small hiatal hernia, gastritis, one duodenal ulcer white based, cratered 1 cm in diameter, no active signs of bleeding   - c/w IV PPI BID  - advance to regular diet  - Hgb 7.0 this 3/26 am, unsure if truly bleeding because endoscopy showed no bleed and he is no longer dizzy and walked to bathroom, will repeat CBC, give iron infusion  - start IV iron Patient presents with 5 days of black stools, dizziness, and recent hemoglobin drop from 13-->7.1 in ~2 weeks time, elevated BUN, guaiac positive in outpatient doctor, likely secondary to GI bleed. DDx for cause of bleed aspirin use vs H pylori. No NSAID use, alcohol, liver dysfunction, herbs/supplement use, abnormal cscope.  -s/p 1L NS and 1U pRBC on 3/25 evening with hgb 7.2-->7.5   - GI consulted, Dr. Lindsey performed EGD (3/25) = small hiatal hernia, gastritis, one duodenal ulcer white based, cratered 1 cm in diameter, no active signs of bleeding   - c/w IV PPI BID  - advance to regular diet  - start IV iron  - Hgb 7.0 on 3/26 am, unsure if truly bleeding because endoscopy showed no bleed and he is no longer dizzy and walked to bathroom, repeat hgb again 7.0 at 17:00, will give 1 unit prbc Patient presents with 5 days of black stools, dizziness, and recent hemoglobin drop from 13-->7.1 in ~2 weeks time, elevated BUN, guaiac positive in outpatient doctor, likely secondary to GI bleed. DDx for cause of bleed aspirin use vs H pylori. No NSAID use, alcohol, liver dysfunction, herbs/supplement use, abnormal cscope.  -s/p 1L NS and 1U pRBC on 3/25 evening with hgb 7.2-->7.5   - GI consulted, Dr. Lindsey performed EGD (3/25) = small hiatal hernia, gastritis, one duodenal ulcer white based, cratered 1 cm in diameter, no active signs of bleeding   - c/w IV PPI BID  - advance to regular diet  - start IV iron  - Hgb 7.0 on 3/26 am, unsure if truly bleeding because endoscopy showed no bleed and he is no longer dizzy and walked to bathroom, repeat hgb again 7.0 at 17:00, will give 1 unit prbc (this will be his 2nd unit prbc)

## 2022-03-26 NOTE — PHYSICAL THERAPY INITIAL EVALUATION ADULT - PERTINENT HX OF CURRENT PROBLEM, REHAB EVAL
75 yo male with recent spinal stenosis surgery, HLD, urinary retention, who presents with dizziness, found to have new drop in hemoglobin, elevated BUN recent aspirin use, melanotic stools, admitted for concern for upper GI bleed.

## 2022-03-26 NOTE — PROGRESS NOTE ADULT - SUBJECTIVE AND OBJECTIVE BOX
MIO HEBERT, 76y, Male  MRN-[id number]  Patient is a 76y old  Male who presents with a chief complaint of GIB (25 Mar 2022 16:54)      OVERNIGHT EVENTS:  7pm Hgb 7.7; K 3.9 -- given 40meq PO x1.       SUBJECTIVE: Patient seen and examined at bedside. Reports feeling fine. Denies fevers, chills, HA, chest pain, sob, nausea, vomiting, abdominal pain, diarrhea, constipation, dysuria.     12 Point ROS Negative unless noted otherwise above.  -------------------------------------------------------------------------------  VITAL SIGNS:  Vital Signs Last 24 Hrs  T(C): 36.8 (26 Mar 2022 04:30), Max: 37 (25 Mar 2022 11:36)  T(F): 98.3 (26 Mar 2022 04:30), Max: 98.6 (25 Mar 2022 11:36)  HR: 100 (26 Mar 2022 04:20) (85 - 100)  BP: 111/56 (26 Mar 2022 04:20) (99/67 - 138/56)  BP(mean): 79 (26 Mar 2022 04:20) (79 - 87)  RR: 16 (26 Mar 2022 04:20) (16 - 18)  SpO2: 99% (26 Mar 2022 04:20) (96% - 100%)  I&O's Summary    25 Mar 2022 07:01  -  26 Mar 2022 07:00  --------------------------------------------------------  IN: 480 mL / OUT: 575 mL / NET: -95 mL    26 Mar 2022 07:01  -  26 Mar 2022 09:12  --------------------------------------------------------  IN: 0 mL / OUT: 400 mL / NET: -400 mL        PHYSICAL EXAM:    General: elderly man wearing cervical collar in NAD, well developed  HEENT: NC/AT; EOMI, PERRLA, anicteric sclera; moist mucosal membranes.  Neck: wearing cervical collar   Cardiovascular: RRR, +S1/S2; NO M/R/G  Respiratory: CTA B/L; no W/R/R  Gastrointestinal: soft, NT/ND; +BSx4  Extremities: WWP; no edema or cyanosis  Vascular: 2+ radial, DP/PT pulses B/L  Neurological: AAOx3; no focal deficits    ALLERGIES:  Allergies    No Known Allergies    Intolerances        MEDICATIONS:  MEDICATIONS  (STANDING):  atorvastatin 40 milliGRAM(s) Oral at bedtime  dextrose 5%. 1000 milliLiter(s) (50 mL/Hr) IV Continuous <Continuous>  dextrose 5%. 1000 milliLiter(s) (100 mL/Hr) IV Continuous <Continuous>  dextrose 50% Injectable 25 Gram(s) IV Push once  dextrose 50% Injectable 12.5 Gram(s) IV Push once  dextrose 50% Injectable 25 Gram(s) IV Push once  glucagon  Injectable 1 milliGRAM(s) IntraMuscular once  insulin lispro (ADMELOG) corrective regimen sliding scale   SubCutaneous three times a day before meals  pantoprazole  Injectable 40 milliGRAM(s) IV Push every 12 hours  tamsulosin 0.4 milliGRAM(s) Oral every 12 hours    MEDICATIONS  (PRN):  dextrose Oral Gel 15 Gram(s) Oral once PRN Blood Glucose LESS THAN 70 milliGRAM(s)/deciliter      -------------------------------------------------------------------------------  LABS:                        7.7    10.31 )-----------( 302      ( 25 Mar 2022 19:19 )             23.3     03-25    142  |  110<H>  |  34<H>  ----------------------------<  160<H>  3.9   |  24  |  0.90    Ca    8.7      25 Mar 2022 19:19    TPro  5.5<L>  /  Alb  3.5  /  TBili  0.2  /  DBili  x   /  AST  44<H>  /  ALT  66<H>  /  AlkPhos  62  03-25    LIVER FUNCTIONS - ( 25 Mar 2022 11:23 )  Alb: 3.5 g/dL / Pro: 5.5 g/dL / ALK PHOS: 62 U/L / ALT: 66 U/L / AST: 44 U/L / GGT: x           PT/INR - ( 25 Mar 2022 11:23 )   PT: 14.0 sec;   INR: 1.17          PTT - ( 25 Mar 2022 11:23 )  PTT:25.0 sec    CAPILLARY BLOOD GLUCOSE      POCT Blood Glucose.: 130 mg/dL (26 Mar 2022 06:51)      COVID-19 PCR: Negative (25 Mar 2022 11:23)      RADIOLOGY & ADDITIONAL TESTS: Reviewed.       MIO HEBERT, 76y, Male  MRN-[id number]  Patient is a 76y old  Male who presents with a chief complaint of GIB (25 Mar 2022 16:54)      OVERNIGHT EVENTS:  7pm Hgb 7.7; K 3.9 -- given 40meq PO x1.       SUBJECTIVE: Patient seen and examined at bedside. Reports feeling fine. Denies fevers, chills, HA, chest pain, sob, nausea, vomiting, abdominal pain, diarrhea, constipation, dysuria.     12 Point ROS Negative unless noted otherwise above.  -------------------------------------------------------------------------------  VITAL SIGNS:  Vital Signs Last 24 Hrs  T(C): 36.8 (26 Mar 2022 04:30), Max: 37 (25 Mar 2022 11:36)  T(F): 98.3 (26 Mar 2022 04:30), Max: 98.6 (25 Mar 2022 11:36)  HR: 100 (26 Mar 2022 04:20) (85 - 100)  BP: 111/56 (26 Mar 2022 04:20) (99/67 - 138/56)  BP(mean): 79 (26 Mar 2022 04:20) (79 - 87)  RR: 16 (26 Mar 2022 04:20) (16 - 18)  SpO2: 99% (26 Mar 2022 04:20) (96% - 100%)  I&O's Summary    25 Mar 2022 07:01  -  26 Mar 2022 07:00  --------------------------------------------------------  IN: 480 mL / OUT: 575 mL / NET: -95 mL    26 Mar 2022 07:01  -  26 Mar 2022 09:12  --------------------------------------------------------  IN: 0 mL / OUT: 400 mL / NET: -400 mL        PHYSICAL EXAM:    General: elderly man wearing cervical collar in NAD, well developed  HEENT: NC/AT; EOMI, PERRLA, anicteric sclera; moist mucosal membranes.  Neck: wearing cervical collar   Cardiovascular: RRR, +S1/S2; NO M/R/G  Respiratory: CTA B/L; no W/R/R  Gastrointestinal: soft, +mild suprapubic tenderness; +BSx4  Extremities: WWP; no edema or cyanosis  Vascular: 2+ radial, DP/PT pulses B/L  Neurological: AAOx3; no focal deficits    ALLERGIES:  Allergies    No Known Allergies    Intolerances        MEDICATIONS:  MEDICATIONS  (STANDING):  atorvastatin 40 milliGRAM(s) Oral at bedtime  dextrose 5%. 1000 milliLiter(s) (50 mL/Hr) IV Continuous <Continuous>  dextrose 5%. 1000 milliLiter(s) (100 mL/Hr) IV Continuous <Continuous>  dextrose 50% Injectable 25 Gram(s) IV Push once  dextrose 50% Injectable 12.5 Gram(s) IV Push once  dextrose 50% Injectable 25 Gram(s) IV Push once  glucagon  Injectable 1 milliGRAM(s) IntraMuscular once  insulin lispro (ADMELOG) corrective regimen sliding scale   SubCutaneous three times a day before meals  pantoprazole  Injectable 40 milliGRAM(s) IV Push every 12 hours  tamsulosin 0.4 milliGRAM(s) Oral every 12 hours    MEDICATIONS  (PRN):  dextrose Oral Gel 15 Gram(s) Oral once PRN Blood Glucose LESS THAN 70 milliGRAM(s)/deciliter      -------------------------------------------------------------------------------  LABS:                        7.7    10.31 )-----------( 302      ( 25 Mar 2022 19:19 )             23.3     03-25    142  |  110<H>  |  34<H>  ----------------------------<  160<H>  3.9   |  24  |  0.90    Ca    8.7      25 Mar 2022 19:19    TPro  5.5<L>  /  Alb  3.5  /  TBili  0.2  /  DBili  x   /  AST  44<H>  /  ALT  66<H>  /  AlkPhos  62  03-25    LIVER FUNCTIONS - ( 25 Mar 2022 11:23 )  Alb: 3.5 g/dL / Pro: 5.5 g/dL / ALK PHOS: 62 U/L / ALT: 66 U/L / AST: 44 U/L / GGT: x           PT/INR - ( 25 Mar 2022 11:23 )   PT: 14.0 sec;   INR: 1.17          PTT - ( 25 Mar 2022 11:23 )  PTT:25.0 sec    CAPILLARY BLOOD GLUCOSE      POCT Blood Glucose.: 130 mg/dL (26 Mar 2022 06:51)      COVID-19 PCR: Negative (25 Mar 2022 11:23)      RADIOLOGY & ADDITIONAL TESTS: Reviewed.       MIO HEBERT, 76y, Male  MRN-[id number]  Patient is a 76y old  Male who presents with a chief complaint of GIB (25 Mar 2022 16:54)      OVERNIGHT EVENTS:  7pm Hgb 7.7; K 3.9 -- given 40meq PO x1.       SUBJECTIVE: Patient seen and examined at bedside. Reports feeling fine. Denies fevers, chills, HA, chest pain, sob, nausea, vomiting, abdominal pain, diarrhea, constipation, dysuria.     12 Point ROS Negative unless noted otherwise above.  -------------------------------------------------------------------------------  VITAL SIGNS:  Vital Signs Last 24 Hrs  T(C): 36.8 (26 Mar 2022 04:30), Max: 37 (25 Mar 2022 11:36)  T(F): 98.3 (26 Mar 2022 04:30), Max: 98.6 (25 Mar 2022 11:36)  HR: 100 (26 Mar 2022 04:20) (85 - 100)  BP: 111/56 (26 Mar 2022 04:20) (99/67 - 138/56)  BP(mean): 79 (26 Mar 2022 04:20) (79 - 87)  RR: 16 (26 Mar 2022 04:20) (16 - 18)  SpO2: 99% (26 Mar 2022 04:20) (96% - 100%)  I&O's Summary    25 Mar 2022 07:01  -  26 Mar 2022 07:00  --------------------------------------------------------  IN: 480 mL / OUT: 575 mL / NET: -95 mL    26 Mar 2022 07:01  -  26 Mar 2022 09:12  --------------------------------------------------------  IN: 0 mL / OUT: 400 mL / NET: -400 mL        PHYSICAL EXAM:    General: elderly man wearing cervical collar in NAD, well developed  HEENT: NC/AT; EOMI, PERRLA, anicteric sclera; moist mucosal membranes.  Neck: wearing cervical collar   Cardiovascular: RRR, +S1/S2; NO M/R/G  Respiratory: CTA B/L; no W/R/R  Gastrointestinal: soft, +mild suprapubic tenderness; +BSx4  Extremities: WWP; no edema or cyanosis  Vascular: 2+ radial, DP/PT pulses B/L  Neurological: AAOx3; no focal deficits  Skin: no rash    ALLERGIES:  Allergies    No Known Allergies    Intolerances        MEDICATIONS:  MEDICATIONS  (STANDING):  atorvastatin 40 milliGRAM(s) Oral at bedtime  dextrose 5%. 1000 milliLiter(s) (50 mL/Hr) IV Continuous <Continuous>  dextrose 5%. 1000 milliLiter(s) (100 mL/Hr) IV Continuous <Continuous>  dextrose 50% Injectable 25 Gram(s) IV Push once  dextrose 50% Injectable 12.5 Gram(s) IV Push once  dextrose 50% Injectable 25 Gram(s) IV Push once  glucagon  Injectable 1 milliGRAM(s) IntraMuscular once  insulin lispro (ADMELOG) corrective regimen sliding scale   SubCutaneous three times a day before meals  pantoprazole  Injectable 40 milliGRAM(s) IV Push every 12 hours  tamsulosin 0.4 milliGRAM(s) Oral every 12 hours    MEDICATIONS  (PRN):  dextrose Oral Gel 15 Gram(s) Oral once PRN Blood Glucose LESS THAN 70 milliGRAM(s)/deciliter      -------------------------------------------------------------------------------  LABS:                        7.7    10.31 )-----------( 302      ( 25 Mar 2022 19:19 )             23.3     03-25    142  |  110<H>  |  34<H>  ----------------------------<  160<H>  3.9   |  24  |  0.90    Ca    8.7      25 Mar 2022 19:19    TPro  5.5<L>  /  Alb  3.5  /  TBili  0.2  /  DBili  x   /  AST  44<H>  /  ALT  66<H>  /  AlkPhos  62  03-25    LIVER FUNCTIONS - ( 25 Mar 2022 11:23 )  Alb: 3.5 g/dL / Pro: 5.5 g/dL / ALK PHOS: 62 U/L / ALT: 66 U/L / AST: 44 U/L / GGT: x           PT/INR - ( 25 Mar 2022 11:23 )   PT: 14.0 sec;   INR: 1.17          PTT - ( 25 Mar 2022 11:23 )  PTT:25.0 sec    CAPILLARY BLOOD GLUCOSE      POCT Blood Glucose.: 130 mg/dL (26 Mar 2022 06:51)      COVID-19 PCR: Negative (25 Mar 2022 11:23)      RADIOLOGY & ADDITIONAL TESTS: Reviewed.

## 2022-03-27 ENCOUNTER — TRANSCRIPTION ENCOUNTER (OUTPATIENT)
Age: 77
End: 2022-03-27

## 2022-03-27 LAB
ALBUMIN SERPL ELPH-MCNC: 3.2 G/DL — LOW (ref 3.3–5)
ALP SERPL-CCNC: 59 U/L — SIGNIFICANT CHANGE UP (ref 40–120)
ALT FLD-CCNC: 41 U/L — SIGNIFICANT CHANGE UP (ref 10–45)
ANION GAP SERPL CALC-SCNC: 8 MMOL/L — SIGNIFICANT CHANGE UP (ref 5–17)
ANION GAP SERPL CALC-SCNC: 9 MMOL/L — SIGNIFICANT CHANGE UP (ref 5–17)
AST SERPL-CCNC: 28 U/L — SIGNIFICANT CHANGE UP (ref 10–40)
BASOPHILS # BLD AUTO: 0.02 K/UL — SIGNIFICANT CHANGE UP (ref 0–0.2)
BASOPHILS NFR BLD AUTO: 0.3 % — SIGNIFICANT CHANGE UP (ref 0–2)
BILIRUB SERPL-MCNC: 0.2 MG/DL — SIGNIFICANT CHANGE UP (ref 0.2–1.2)
BUN SERPL-MCNC: 16 MG/DL — SIGNIFICANT CHANGE UP (ref 7–23)
BUN SERPL-MCNC: 20 MG/DL — SIGNIFICANT CHANGE UP (ref 7–23)
CALCIUM SERPL-MCNC: 8.2 MG/DL — LOW (ref 8.4–10.5)
CALCIUM SERPL-MCNC: 8.3 MG/DL — LOW (ref 8.4–10.5)
CHLORIDE SERPL-SCNC: 108 MMOL/L — SIGNIFICANT CHANGE UP (ref 96–108)
CHLORIDE SERPL-SCNC: 110 MMOL/L — HIGH (ref 96–108)
CO2 SERPL-SCNC: 24 MMOL/L — SIGNIFICANT CHANGE UP (ref 22–31)
CO2 SERPL-SCNC: 25 MMOL/L — SIGNIFICANT CHANGE UP (ref 22–31)
CREAT SERPL-MCNC: 0.91 MG/DL — SIGNIFICANT CHANGE UP (ref 0.5–1.3)
CREAT SERPL-MCNC: 0.91 MG/DL — SIGNIFICANT CHANGE UP (ref 0.5–1.3)
EGFR: 88 ML/MIN/1.73M2 — SIGNIFICANT CHANGE UP
EGFR: 88 ML/MIN/1.73M2 — SIGNIFICANT CHANGE UP
EOSINOPHIL # BLD AUTO: 0.4 K/UL — SIGNIFICANT CHANGE UP (ref 0–0.5)
EOSINOPHIL NFR BLD AUTO: 5.4 % — SIGNIFICANT CHANGE UP (ref 0–6)
GLUCOSE BLDC GLUCOMTR-MCNC: 124 MG/DL — HIGH (ref 70–99)
GLUCOSE BLDC GLUCOMTR-MCNC: 215 MG/DL — HIGH (ref 70–99)
GLUCOSE BLDC GLUCOMTR-MCNC: 228 MG/DL — HIGH (ref 70–99)
GLUCOSE SERPL-MCNC: 100 MG/DL — HIGH (ref 70–99)
GLUCOSE SERPL-MCNC: 119 MG/DL — HIGH (ref 70–99)
HCT VFR BLD CALC: 22.3 % — LOW (ref 39–50)
HCT VFR BLD CALC: 23.9 % — LOW (ref 39–50)
HCT VFR BLD CALC: 25 % — LOW (ref 39–50)
HGB BLD-MCNC: 7.4 G/DL — LOW (ref 13–17)
HGB BLD-MCNC: 7.9 G/DL — LOW (ref 13–17)
HGB BLD-MCNC: 8.1 G/DL — LOW (ref 13–17)
IMM GRANULOCYTES NFR BLD AUTO: 1.1 % — SIGNIFICANT CHANGE UP (ref 0–1.5)
LYMPHOCYTES # BLD AUTO: 1.4 K/UL — SIGNIFICANT CHANGE UP (ref 1–3.3)
LYMPHOCYTES # BLD AUTO: 19 % — SIGNIFICANT CHANGE UP (ref 13–44)
MAGNESIUM SERPL-MCNC: 1.9 MG/DL — SIGNIFICANT CHANGE UP (ref 1.6–2.6)
MCHC RBC-ENTMCNC: 29.5 PG — SIGNIFICANT CHANGE UP (ref 27–34)
MCHC RBC-ENTMCNC: 29.6 PG — SIGNIFICANT CHANGE UP (ref 27–34)
MCHC RBC-ENTMCNC: 29.9 PG — SIGNIFICANT CHANGE UP (ref 27–34)
MCHC RBC-ENTMCNC: 32.4 GM/DL — SIGNIFICANT CHANGE UP (ref 32–36)
MCHC RBC-ENTMCNC: 33.1 GM/DL — SIGNIFICANT CHANGE UP (ref 32–36)
MCHC RBC-ENTMCNC: 33.2 GM/DL — SIGNIFICANT CHANGE UP (ref 32–36)
MCV RBC AUTO: 88.8 FL — SIGNIFICANT CHANGE UP (ref 80–100)
MCV RBC AUTO: 90.5 FL — SIGNIFICANT CHANGE UP (ref 80–100)
MCV RBC AUTO: 91.2 FL — SIGNIFICANT CHANGE UP (ref 80–100)
MONOCYTES # BLD AUTO: 0.48 K/UL — SIGNIFICANT CHANGE UP (ref 0–0.9)
MONOCYTES NFR BLD AUTO: 6.5 % — SIGNIFICANT CHANGE UP (ref 2–14)
NEUTROPHILS # BLD AUTO: 4.97 K/UL — SIGNIFICANT CHANGE UP (ref 1.8–7.4)
NEUTROPHILS NFR BLD AUTO: 67.7 % — SIGNIFICANT CHANGE UP (ref 43–77)
NRBC # BLD: 0 /100 WBCS — SIGNIFICANT CHANGE UP (ref 0–0)
PHOSPHATE SERPL-MCNC: 2.7 MG/DL — SIGNIFICANT CHANGE UP (ref 2.5–4.5)
PLATELET # BLD AUTO: 266 K/UL — SIGNIFICANT CHANGE UP (ref 150–400)
PLATELET # BLD AUTO: 297 K/UL — SIGNIFICANT CHANGE UP (ref 150–400)
PLATELET # BLD AUTO: 317 K/UL — SIGNIFICANT CHANGE UP (ref 150–400)
POTASSIUM SERPL-MCNC: 3.8 MMOL/L — SIGNIFICANT CHANGE UP (ref 3.5–5.3)
POTASSIUM SERPL-MCNC: 3.9 MMOL/L — SIGNIFICANT CHANGE UP (ref 3.5–5.3)
POTASSIUM SERPL-SCNC: 3.8 MMOL/L — SIGNIFICANT CHANGE UP (ref 3.5–5.3)
POTASSIUM SERPL-SCNC: 3.9 MMOL/L — SIGNIFICANT CHANGE UP (ref 3.5–5.3)
PROT SERPL-MCNC: 4.8 G/DL — LOW (ref 6–8.3)
RBC # BLD: 2.51 M/UL — LOW (ref 4.2–5.8)
RBC # BLD: 2.64 M/UL — LOW (ref 4.2–5.8)
RBC # BLD: 2.74 M/UL — LOW (ref 4.2–5.8)
RBC # FLD: 16.4 % — HIGH (ref 10.3–14.5)
RBC # FLD: 16.4 % — HIGH (ref 10.3–14.5)
RBC # FLD: 16.6 % — HIGH (ref 10.3–14.5)
SODIUM SERPL-SCNC: 142 MMOL/L — SIGNIFICANT CHANGE UP (ref 135–145)
SODIUM SERPL-SCNC: 142 MMOL/L — SIGNIFICANT CHANGE UP (ref 135–145)
WBC # BLD: 7.35 K/UL — SIGNIFICANT CHANGE UP (ref 3.8–10.5)
WBC # BLD: 7.47 K/UL — SIGNIFICANT CHANGE UP (ref 3.8–10.5)
WBC # BLD: 8.3 K/UL — SIGNIFICANT CHANGE UP (ref 3.8–10.5)
WBC # FLD AUTO: 7.35 K/UL — SIGNIFICANT CHANGE UP (ref 3.8–10.5)
WBC # FLD AUTO: 7.47 K/UL — SIGNIFICANT CHANGE UP (ref 3.8–10.5)
WBC # FLD AUTO: 8.3 K/UL — SIGNIFICANT CHANGE UP (ref 3.8–10.5)

## 2022-03-27 PROCEDURE — 99232 SBSQ HOSP IP/OBS MODERATE 35: CPT | Mod: GC

## 2022-03-27 RX ORDER — ASPIRIN/CALCIUM CARB/MAGNESIUM 324 MG
1 TABLET ORAL
Qty: 0 | Refills: 0 | DISCHARGE

## 2022-03-27 RX ORDER — POTASSIUM PHOSPHATE, MONOBASIC POTASSIUM PHOSPHATE, DIBASIC 236; 224 MG/ML; MG/ML
15 INJECTION, SOLUTION INTRAVENOUS ONCE
Refills: 0 | Status: COMPLETED | OUTPATIENT
Start: 2022-03-27 | End: 2022-03-27

## 2022-03-27 RX ORDER — MAGNESIUM SULFATE 500 MG/ML
2 VIAL (ML) INJECTION ONCE
Refills: 0 | Status: COMPLETED | OUTPATIENT
Start: 2022-03-27 | End: 2022-03-27

## 2022-03-27 RX ORDER — PANTOPRAZOLE SODIUM 20 MG/1
40 TABLET, DELAYED RELEASE ORAL EVERY 12 HOURS
Refills: 0 | Status: DISCONTINUED | OUTPATIENT
Start: 2022-03-27 | End: 2022-03-28

## 2022-03-27 RX ORDER — FERROUS SULFATE 325(65) MG
1 TABLET ORAL
Qty: 2 | Refills: 0
Start: 2022-03-27 | End: 2022-03-29

## 2022-03-27 RX ORDER — PANTOPRAZOLE SODIUM 20 MG/1
1 TABLET, DELAYED RELEASE ORAL
Qty: 30 | Refills: 0
Start: 2022-03-27 | End: 2022-04-25

## 2022-03-27 RX ADMIN — Medication 4: at 13:12

## 2022-03-27 RX ADMIN — Medication 25 GRAM(S): at 09:18

## 2022-03-27 RX ADMIN — IRON SUCROSE 110 MILLIGRAM(S): 20 INJECTION, SOLUTION INTRAVENOUS at 10:23

## 2022-03-27 RX ADMIN — ATORVASTATIN CALCIUM 40 MILLIGRAM(S): 80 TABLET, FILM COATED ORAL at 21:27

## 2022-03-27 RX ADMIN — PANTOPRAZOLE SODIUM 40 MILLIGRAM(S): 20 TABLET, DELAYED RELEASE ORAL at 06:05

## 2022-03-27 RX ADMIN — TAMSULOSIN HYDROCHLORIDE 0.4 MILLIGRAM(S): 0.4 CAPSULE ORAL at 06:05

## 2022-03-27 RX ADMIN — TAMSULOSIN HYDROCHLORIDE 0.4 MILLIGRAM(S): 0.4 CAPSULE ORAL at 17:28

## 2022-03-27 RX ADMIN — POTASSIUM PHOSPHATE, MONOBASIC POTASSIUM PHOSPHATE, DIBASIC 62.5 MILLIMOLE(S): 236; 224 INJECTION, SOLUTION INTRAVENOUS at 10:40

## 2022-03-27 RX ADMIN — PANTOPRAZOLE SODIUM 40 MILLIGRAM(S): 20 TABLET, DELAYED RELEASE ORAL at 17:28

## 2022-03-27 NOTE — DISCHARGE NOTE PROVIDER - NSDCCPCAREPLAN_GEN_ALL_CORE_FT
PRINCIPAL DISCHARGE DIAGNOSIS  Diagnosis: UGIB (upper gastrointestinal bleed)  Assessment and Plan of Treatment: Melena is the passage of digested blood through the anus, usually in or with stools. Commonly associated with upper gastrointestinal bleed (UGIB). You have a history of ASA use for prophylaxis, which is a common cause of UGIB. We recommend you discontinue taking further aspirin as it can put you at risk for further GI bleeds. You recieved 2 units of blood during this admission. Your episodes of bloody BM resolved. Please follow up with your Primary Care Physician and Gastroenterologist within 2 weeks of discharge from the hospital for further evaluation.      SECONDARY DISCHARGE DIAGNOSES  Diagnosis: Hyperlipidemia  Assessment and Plan of Treatment: You have high cholesterol. Cholesterol is a substance that is found in the blood. Everyone has some. It is needed for good health. The problem is, people sometimes have too much cholesterol. Compared with people with normal cholesterol, people with high cholesterol have a higher risk of heart attacks, strokes, and other health problems. The higher your cholesterol, the higher your risk of these problems. It is important to take your medications for high cholesterol as prescribed to prevent the complications of this condition. Please follow up with your Primary Care Physician within 2 weeks of discharge.     PRINCIPAL DISCHARGE DIAGNOSIS  Diagnosis: UGIB (upper gastrointestinal bleed)  Assessment and Plan of Treatment: Melena is the passage of digested blood through the anus, usually in or with stools. Commonly associated with upper gastrointestinal bleed (UGIB). You have a history of ASA use for prophylaxis, which is a common cause of UGIB. We recommend you discontinue taking further aspirin as it can put you at risk for further GI bleeds. You recieved 2 units of blood during this admission. Your episodes of bloody BM resolved. Please take Pantoprazole 40mg twice daily for 1 month. Please follow up with your Primary Care Physician and Gastroenterologist within 2 weeks of discharge from the hospital for further evaluation.      SECONDARY DISCHARGE DIAGNOSES  Diagnosis: Hyperlipidemia  Assessment and Plan of Treatment: You have high cholesterol. Cholesterol is a substance that is found in the blood. Everyone has some. It is needed for good health. The problem is, people sometimes have too much cholesterol. Compared with people with normal cholesterol, people with high cholesterol have a higher risk of heart attacks, strokes, and other health problems. The higher your cholesterol, the higher your risk of these problems. It is important to take your medications for high cholesterol as prescribed to prevent the complications of this condition. Please follow up with your Primary Care Physician within 2 weeks of discharge.

## 2022-03-27 NOTE — PROGRESS NOTE ADULT - PROBLEM SELECTOR PLAN 1
Patient presents with 5 days of black stools, dizziness, and recent hemoglobin drop from 13-->7.1 in ~2 weeks time, elevated BUN, guaiac positive in outpatient doctor, likely secondary to GI bleed. DDx for cause of bleed aspirin use vs H pylori. No NSAID use, alcohol, liver dysfunction, herbs/supplement use, abnormal cscope. S/p 2u pRBC total this admission and 1L NS  - GI consulted, Dr. Lindsey performed EGD (3/25) = small hiatal hernia, gastritis, one duodenal ulcer white based, cratered 1 cm in diameter, no active signs of bleeding   - c/w IV PPI BID  - advance to regular diet  - start IV iron (-3/30)  - Patient had 2 episodes of melanotic stools overnight, AM Hgb 7.4, continue to monitor

## 2022-03-27 NOTE — PROGRESS NOTE ADULT - SUBJECTIVE AND OBJECTIVE BOX
** INCOMPLETE **     INTERVAL HPI/OVERNIGHT EVENTS:    SUBJECTIVE: Patient seen and examined at bedside.    VITAL SIGNS:  ICU Vital Signs Last 24 Hrs  T(C): 36.7 (27 Mar 2022 04:30), Max: 37.1 (26 Mar 2022 14:02)  T(F): 98.1 (27 Mar 2022 04:30), Max: 98.7 (26 Mar 2022 14:02)  HR: 67 (27 Mar 2022 05:30) (67 - 105)  BP: 112/57 (27 Mar 2022 05:30) (108/52 - 145/63)  BP(mean): 76 (27 Mar 2022 05:30) (75 - 91)  ABP: --  ABP(mean): --  RR: 20 (27 Mar 2022 05:30) (17 - 20)  SpO2: 91% (27 Mar 2022 05:30) (91% - 99%)        03-25 @ 07:01  -  03-26 @ 07:00  --------------------------------------------------------  IN: 480 mL / OUT: 575 mL / NET: -95 mL    03-26 @ 07:01  -  03-27 @ 06:54  --------------------------------------------------------  IN: 520 mL / OUT: 1000 mL / NET: -480 mL      CAPILLARY BLOOD GLUCOSE      POCT Blood Glucose.: 124 mg/dL (27 Mar 2022 06:43)      PHYSICAL EXAM:    Constitutional: NAD  HEENT: NC/AT; PERRL, anicteric sclera; MMM  Neck: supple, no JVD  Cardiovascular: +S1/S2, RRR  Respiratory: CTA B/L, no W/R/R  Gastrointestinal: abdomen soft, NT/ND; no rebound or guarding; +BSx4  Genitourinary: no suprapubic tenderness or fullness  Extremities: WWP; no LE edema; no clubbing or cyanosis  Vascular: 2+ radial, DP/PT and femoral pulses B/L  Dermatologic: normal color and turgor; no visible rashes  Neurological:     MEDICATIONS:  MEDICATIONS  (STANDING):  atorvastatin 40 milliGRAM(s) Oral at bedtime  dextrose 5%. 1000 milliLiter(s) (100 mL/Hr) IV Continuous <Continuous>  dextrose 5%. 1000 milliLiter(s) (50 mL/Hr) IV Continuous <Continuous>  dextrose 50% Injectable 25 Gram(s) IV Push once  dextrose 50% Injectable 12.5 Gram(s) IV Push once  dextrose 50% Injectable 25 Gram(s) IV Push once  glucagon  Injectable 1 milliGRAM(s) IntraMuscular once  insulin lispro (ADMELOG) corrective regimen sliding scale   SubCutaneous three times a day before meals  iron sucrose IVPB 200 milliGRAM(s) IV Intermittent every 24 hours  pantoprazole  Injectable 40 milliGRAM(s) IV Push every 12 hours  tamsulosin 0.4 milliGRAM(s) Oral every 12 hours    MEDICATIONS  (PRN):  dextrose Oral Gel 15 Gram(s) Oral once PRN Blood Glucose LESS THAN 70 milliGRAM(s)/deciliter      ALLERGIES:  Allergies    No Known Allergies    Intolerances        LABS:                        8.0    9.60  )-----------( 287      ( 26 Mar 2022 21:40 )             24.1     03-26    142  |  110<H>  |  27<H>  ----------------------------<  187<H>  4.0   |  24  |  0.87    Ca    8.6      26 Mar 2022 10:40  Phos  2.2     03-26  Mg     2.1     03-26    TPro  5.5<L>  /  Alb  3.5  /  TBili  0.2  /  DBili  x   /  AST  44<H>  /  ALT  66<H>  /  AlkPhos  62  03-25    PT/INR - ( 25 Mar 2022 11:23 )   PT: 14.0 sec;   INR: 1.17          PTT - ( 25 Mar 2022 11:23 )  PTT:25.0 sec      RADIOLOGY & ADDITIONAL TESTS: Reviewed. ** INCOMPLETE **     Hospital Course:  77 yo male with recent spinal stenosis surgery, HLD, urinary retention, who presents from PMD with chief complaint of dizziness and chest palpitations after having 1-2 melanotic stools daily x5d, found to have Hgb 7.2. S/p 1pRBC without adequate response. Of note, patient had spinal surgery at [facility] 2 weeks ago and subsequently placed on aspirin for DVT ppx. GI consulted, Dr. Lindsey performed EGD (3/25) = small hiatal hernia, gastritis, one duodenal ulcer white based, cratered 1 cm in diameter, no active signs of bleeding. Regular diet started. Hgb dropped to 7, despite no active signs of bleeding. S/p 1pRBC with repeat Hgb 8. Patient continued to have two episodes of melanotic stools overnight, but have decreased in frequency and volume per patient. PT recommended home with no needs.     INTERVAL HPI/OVERNIGHT EVENTS:  Patient had 1-2 episodes of melanotic stools, patient remained asymptomatic.     SUBJECTIVE:   Patient seen and examined at bedside, resting comfortably in bed, and does not appear to be in any acute distress. Patient states he feels fine today. Denies any recent fevers, chills, nausea, vomiting, headache, acute sob, chest pain, abdominal pain, genitourinary sx, extremity pain or swelling.       VITAL SIGNS:  ICU Vital Signs Last 24 Hrs  T(C): 36.7 (27 Mar 2022 04:30), Max: 37.1 (26 Mar 2022 14:02)  T(F): 98.1 (27 Mar 2022 04:30), Max: 98.7 (26 Mar 2022 14:02)  HR: 67 (27 Mar 2022 05:30) (67 - 105)  BP: 112/57 (27 Mar 2022 05:30) (108/52 - 145/63)  BP(mean): 76 (27 Mar 2022 05:30) (75 - 91)  ABP: --  ABP(mean): --  RR: 20 (27 Mar 2022 05:30) (17 - 20)  SpO2: 91% (27 Mar 2022 05:30) (91% - 99%)        03-25 @ 07:01  -  03-26 @ 07:00  --------------------------------------------------------  IN: 480 mL / OUT: 575 mL / NET: -95 mL    03-26 @ 07:01  -  03-27 @ 06:54  --------------------------------------------------------  IN: 520 mL / OUT: 1000 mL / NET: -480 mL      CAPILLARY BLOOD GLUCOSE      POCT Blood Glucose.: 124 mg/dL (27 Mar 2022 06:43)      PHYSICAL EXAM:    Constitutional: NAD  HEENT: NC/AT; PERRL, anicteric sclera; dry MM  Neck: supple, no JVD, +cervical neck collar   Cardiovascular: +S1/S2, RRR, +systolic murmur RUSB  Respiratory: CTA B/L, no W/R/R, looks comfortable on room air, without accessory muscle use or increased WOB  Gastrointestinal: abdomen soft, NT/ND; no rebound or guarding; +BSx4  Genitourinary: no suprapubic tenderness or fullness  Extremities: WWP; no LE edema; no clubbing or cyanosis  Vascular: 2+ radial, DP/PT and femoral pulses B/L  Dermatologic: normal color and turgor; no visible rashes      MEDICATIONS:  MEDICATIONS  (STANDING):  atorvastatin 40 milliGRAM(s) Oral at bedtime  dextrose 5%. 1000 milliLiter(s) (100 mL/Hr) IV Continuous <Continuous>  dextrose 5%. 1000 milliLiter(s) (50 mL/Hr) IV Continuous <Continuous>  dextrose 50% Injectable 25 Gram(s) IV Push once  dextrose 50% Injectable 12.5 Gram(s) IV Push once  dextrose 50% Injectable 25 Gram(s) IV Push once  glucagon  Injectable 1 milliGRAM(s) IntraMuscular once  insulin lispro (ADMELOG) corrective regimen sliding scale   SubCutaneous three times a day before meals  iron sucrose IVPB 200 milliGRAM(s) IV Intermittent every 24 hours  pantoprazole  Injectable 40 milliGRAM(s) IV Push every 12 hours  tamsulosin 0.4 milliGRAM(s) Oral every 12 hours    MEDICATIONS  (PRN):  dextrose Oral Gel 15 Gram(s) Oral once PRN Blood Glucose LESS THAN 70 milliGRAM(s)/deciliter      ALLERGIES:  Allergies    No Known Allergies    Intolerances        LABS:                        8.0    9.60  )-----------( 287      ( 26 Mar 2022 21:40 )             24.1     03-26    142  |  110<H>  |  27<H>  ----------------------------<  187<H>  4.0   |  24  |  0.87    Ca    8.6      26 Mar 2022 10:40  Phos  2.2     03-26  Mg     2.1     03-26    TPro  5.5<L>  /  Alb  3.5  /  TBili  0.2  /  DBili  x   /  AST  44<H>  /  ALT  66<H>  /  AlkPhos  62  03-25    PT/INR - ( 25 Mar 2022 11:23 )   PT: 14.0 sec;   INR: 1.17          PTT - ( 25 Mar 2022 11:23 )  PTT:25.0 sec      RADIOLOGY & ADDITIONAL TESTS: Reviewed. Hospital Course:  75 yo male with recent spinal stenosis surgery, HLD, urinary retention, who presents from PMD with chief complaint of dizziness and chest palpitations after having 1-2 melanotic stools daily x5d, found to have Hgb 7.2. S/p 1pRBC without adequate response. Of note, patient had spinal surgery at [facility] 2 weeks ago and subsequently placed on aspirin for DVT ppx. GI consulted, Dr. Lindsey performed EGD (3/25) = small hiatal hernia, gastritis, one duodenal ulcer white based, cratered 1 cm in diameter, no active signs of bleeding. Regular diet started. Hgb dropped to 7, despite no active signs of bleeding. S/p 1pRBC with repeat Hgb 8. Patient continued to have two episodes of melanotic stools overnight, but have decreased in frequency and volume per patient. PT recommended home with no needs.     INTERVAL HPI/OVERNIGHT EVENTS:  Patient had 1-2 episodes of melanotic stools, patient remained asymptomatic.     SUBJECTIVE:   Patient seen and examined at bedside, resting comfortably in bed, and does not appear to be in any acute distress. Patient states he feels fine today. Denies any recent fevers, chills, nausea, vomiting, headache, acute sob, chest pain, abdominal pain, genitourinary sx, extremity pain or swelling.       VITAL SIGNS:  ICU Vital Signs Last 24 Hrs  T(C): 36.7 (27 Mar 2022 04:30), Max: 37.1 (26 Mar 2022 14:02)  T(F): 98.1 (27 Mar 2022 04:30), Max: 98.7 (26 Mar 2022 14:02)  HR: 67 (27 Mar 2022 05:30) (67 - 105)  BP: 112/57 (27 Mar 2022 05:30) (108/52 - 145/63)  BP(mean): 76 (27 Mar 2022 05:30) (75 - 91)  ABP: --  ABP(mean): --  RR: 20 (27 Mar 2022 05:30) (17 - 20)  SpO2: 91% (27 Mar 2022 05:30) (91% - 99%)        03-25 @ 07:01  -  03-26 @ 07:00  --------------------------------------------------------  IN: 480 mL / OUT: 575 mL / NET: -95 mL    03-26 @ 07:01  - 03-27 @ 06:54  --------------------------------------------------------  IN: 520 mL / OUT: 1000 mL / NET: -480 mL      CAPILLARY BLOOD GLUCOSE      POCT Blood Glucose.: 124 mg/dL (27 Mar 2022 06:43)      PHYSICAL EXAM:    Constitutional: NAD  HEENT: NC/AT; PERRL, anicteric sclera; dry MM  Neck: supple, no JVD, +cervical neck collar   Cardiovascular: +S1/S2, RRR, +systolic murmur RUSB  Respiratory: CTA B/L, no W/R/R, looks comfortable on room air, without accessory muscle use or increased WOB  Gastrointestinal: abdomen soft, NT/ND; no rebound or guarding; +BSx4  Genitourinary: no suprapubic tenderness or fullness  Extremities: WWP; no LE edema; no clubbing or cyanosis  Vascular: 2+ radial, DP/PT and femoral pulses B/L  Dermatologic: normal color and turgor; no visible rashes      MEDICATIONS:  MEDICATIONS  (STANDING):  atorvastatin 40 milliGRAM(s) Oral at bedtime  dextrose 5%. 1000 milliLiter(s) (100 mL/Hr) IV Continuous <Continuous>  dextrose 5%. 1000 milliLiter(s) (50 mL/Hr) IV Continuous <Continuous>  dextrose 50% Injectable 25 Gram(s) IV Push once  dextrose 50% Injectable 12.5 Gram(s) IV Push once  dextrose 50% Injectable 25 Gram(s) IV Push once  glucagon  Injectable 1 milliGRAM(s) IntraMuscular once  insulin lispro (ADMELOG) corrective regimen sliding scale   SubCutaneous three times a day before meals  iron sucrose IVPB 200 milliGRAM(s) IV Intermittent every 24 hours  pantoprazole  Injectable 40 milliGRAM(s) IV Push every 12 hours  tamsulosin 0.4 milliGRAM(s) Oral every 12 hours    MEDICATIONS  (PRN):  dextrose Oral Gel 15 Gram(s) Oral once PRN Blood Glucose LESS THAN 70 milliGRAM(s)/deciliter      ALLERGIES:  Allergies    No Known Allergies    Intolerances        LABS:                        8.0    9.60  )-----------( 287      ( 26 Mar 2022 21:40 )             24.1     03-26    142  |  110<H>  |  27<H>  ----------------------------<  187<H>  4.0   |  24  |  0.87    Ca    8.6      26 Mar 2022 10:40  Phos  2.2     03-26  Mg     2.1     03-26    TPro  5.5<L>  /  Alb  3.5  /  TBili  0.2  /  DBili  x   /  AST  44<H>  /  ALT  66<H>  /  AlkPhos  62  03-25    PT/INR - ( 25 Mar 2022 11:23 )   PT: 14.0 sec;   INR: 1.17          PTT - ( 25 Mar 2022 11:23 )  PTT:25.0 sec      RADIOLOGY & ADDITIONAL TESTS: Reviewed.

## 2022-03-27 NOTE — DISCHARGE NOTE PROVIDER - CARE PROVIDER_API CALL
Cecil Mendez   INTERNAL MEDICINE  [address] [id number]  Phone: [phone]  Fax: [phone]  Established Patient  Follow Up Time: 2 weeks

## 2022-03-27 NOTE — DISCHARGE NOTE PROVIDER - HOSPITAL COURSE
#Discharge: do not delete    Hospital Course:  75 yo male with recent spinal stenosis surgery, HLD, urinary retention, who presents from PMD with chief complaint of dizziness and chest palpitations after having 1-2 melanotic stools daily x5d, found to have Hgb 7.2. S/p 1pRBC without adequate response. Of note, patient had spinal surgery at [facility] 2 weeks ago and subsequently placed on aspirin for DVT ppx. GI consulted, Dr. Lindsey performed EGD (3/25) = small hiatal hernia, gastritis, one duodenal ulcer white based, cratered 1 cm in diameter, no active signs of bleeding. Regular diet started. Hgb dropped to 7, despite no active signs of bleeding. S/p 1pRBC with repeat Hgb 8. Patient continued to have two episodes of melanotic stools overnight, but have decreased in frequency and volume per patient. PT recommended home with no needs.    Problem List/Main Diagnoses:   #GI bleed.   ·  Plan: Patient presents with 5 days of black stools, dizziness, and recent hemoglobin drop from 13-->7.1 in ~2 weeks time, elevated BUN, guaiac positive in outpatient doctor, likely secondary to GI bleed. DDx for cause of bleed aspirin use vs H pylori. No NSAID use, alcohol, liver dysfunction, herbs/supplement use, abnormal cscope. S/p 2u pRBC total this admission and 1L NS. Patient was started on IV Iron (3/26-3/27). GI consulted, Dr. Lindsey performed EGD (3/25) = small hiatal hernia, gastritis, one duodenal ulcer white based, cratered 1 cm in diameter, no active signs of bleeding   - c/w PPI qd  - c/w PO iron for 3 days  - f/u with PCP within 1 week of discharge     #Urinary retention.   ·  Plan: Patient recently had urinary retention requiring indwelling almazan, removed yesterday 3/24. Voiding freely  -continue flomax 0.4mg BID    #Hyperlipidemia.   ·  Plan: On home crestor 10mg  -continue atorvastatin 40mg.    #Spinal stenosis.   ·  Plan: Known hx of spinal stenosis s/p recent c spine surgery 2-3 wk ago at [facility]  - Surescripts had Tramadol ordered, but pt was not taking given concern for urinary retention.   - Would avoid NSAIDS given duodenal ulcer on EGD  - f/u outpatient.    New medications/therapies: None   New lines/hardware: None  Labs to be followed outpatient: CBC  Exam to be followed outpatient: None    Discharge plan: discharge to Home    Physical Examination upon Discharge:  Constitutional: NAD  HEENT: NC/AT; PERRL, anicteric sclera; dry MM  Neck: supple, no JVD, +cervical neck collar   Cardiovascular: +S1/S2, RRR, +systolic murmur RUSB  Respiratory: CTA B/L, no W/R/R, looks comfortable on room air, without accessory muscle use or increased WOB  Gastrointestinal: abdomen soft, NT/ND; no rebound or guarding; +BSx4  Genitourinary: no suprapubic tenderness or fullness  Extremities: WWP; no LE edema; no clubbing or cyanosis  Vascular: 2+ radial, DP/PT and femoral pulses B/L  Dermatologic: normal color and turgor; no visible rashes #Discharge: do not delete    Hospital Course:  75 yo male with recent spinal stenosis surgery, HLD, urinary retention, who presents from PMD with chief complaint of dizziness and chest palpitations after having 1-2 melanotic stools daily x5d, found to have Hgb 7.2. S/p 1pRBC without adequate response. Of note, patient had spinal surgery at [facility] 2 weeks ago and subsequently placed on aspirin for DVT ppx. GI consulted, Dr. Lindsey performed EGD (3/25) = small hiatal hernia, gastritis, one duodenal ulcer white based, cratered 1 cm in diameter, no active signs of bleeding. Regular diet started. Hgb dropped to 7, despite no active signs of bleeding. S/p 1pRBC with repeat Hgb 8. Patient continued to have two episodes of melanotic stools overnight, but have decreased in frequency and volume per patient. PT recommended home with no needs.    Problem List/Main Diagnoses:   #GI bleed.   ·  Plan: Patient presents with 5 days of black stools, dizziness, and recent hemoglobin drop from 13-->7.1 in ~2 weeks time, elevated BUN, guaiac positive in outpatient doctor, likely secondary to GI bleed. DDx for cause of bleed aspirin use vs H pylori. No NSAID use, alcohol, liver dysfunction, herbs/supplement use, abnormal cscope. S/p 2u pRBC total this admission and 1L NS. Patient was started on IV Iron (3/26-3/27). GI consulted, Dr. Lindsey performed EGD (3/25) = small hiatal hernia, gastritis, one duodenal ulcer white based, cratered 1 cm in diameter, no active signs of bleeding   - c/w PPI BID x1 month, then qd thereafter  - c/w PO iron for 3 days  - f/u with PCP within 1 week of discharge     #Urinary retention.   ·  Plan: Patient recently had urinary retention requiring indwelling almazan, removed yesterday 3/24. Voiding freely  -continue flomax 0.4mg BID    #Hyperlipidemia.   ·  Plan: On home crestor 10mg  -continue atorvastatin 40mg.    #Spinal stenosis.   ·  Plan: Known hx of spinal stenosis s/p recent c spine surgery 2-3 wk ago at [facility]  - Surescripts had Tramadol ordered, but pt was not taking given concern for urinary retention.   - Would avoid NSAIDS given duodenal ulcer on EGD  - f/u outpatient.    New medications/therapies: None   New lines/hardware: None  Labs to be followed outpatient: CBC  Exam to be followed outpatient: None    Discharge plan: discharge to Home    Physical Examination upon Discharge:  Constitutional: NAD  HEENT: NC/AT; PERRL, anicteric sclera; dry MM  Neck: supple, no JVD, +cervical neck collar   Cardiovascular: +S1/S2, RRR, +systolic murmur RUSB  Respiratory: CTA B/L, no W/R/R, looks comfortable on room air, without accessory muscle use or increased WOB  Gastrointestinal: abdomen soft, NT/ND; no rebound or guarding; +BSx4  Genitourinary: no suprapubic tenderness or fullness  Extremities: WWP; no LE edema; no clubbing or cyanosis  Vascular: 2+ radial, DP/PT and femoral pulses B/L  Dermatologic: normal color and turgor; no visible rashes #Discharge: do not delete    Hospital Course:  75 yo male with recent spinal stenosis surgery, HLD, urinary retention, who presents from PMD with chief complaint of dizziness and chest palpitations after having 1-2 melanotic stools daily x5d, found to have Hgb 7.2. S/p 1pRBC without adequate response. Of note, patient had spinal surgery at [facility] 2 weeks ago and subsequently placed on aspirin for DVT ppx. GI consulted, Dr. Lindsey performed EGD (3/25) = small hiatal hernia, gastritis, one duodenal ulcer white based, cratered 1 cm in diameter, no active signs of bleeding. Regular diet started. Hgb dropped to 7, despite no active signs of bleeding. S/p 1pRBC with repeat Hgb 8. Patient continued to have two episodes of melanotic stools overnight, but have decreased in frequency and volume per patient. PT recommended home with no needs.    Problem List/Main Diagnoses:   #GI bleed.   ·  Plan: Patient presents with 5 days of black stools, dizziness, and recent hemoglobin drop from 13-->7.1 in ~2 weeks time, elevated BUN, guaiac positive in outpatient doctor, likely secondary to GI bleed. DDx for cause of bleed aspirin use vs H pylori. No NSAID use, alcohol, liver dysfunction, herbs/supplement use, abnormal cscope. S/p 2u pRBC total this admission and 1L NS. Patient was started on IV Iron (3/26-3/28). GI consulted, Dr. Lindsey performed EGD (3/25) = small hiatal hernia, gastritis, one duodenal ulcer white based, cratered 1 cm in diameter, no active signs of bleeding   - c/w PPI BID x1 month, then qd thereafter  - f/u with PCP within 1 week of discharge     #Urinary retention.   ·  Plan: Patient recently had urinary retention requiring indwelling almazan, removed yesterday 3/24. Voiding freely  -continue flomax 0.4mg BID    #Hyperlipidemia.   ·  Plan: On home crestor 10mg  -continue atorvastatin 40mg.    #Spinal stenosis.   ·  Plan: Known hx of spinal stenosis s/p recent c spine surgery 2-3 wk ago at [facility]  - Surescripts had Tramadol ordered, but pt was not taking given concern for urinary retention.   - Would avoid NSAIDS given duodenal ulcer on EGD  - f/u outpatient.    New medications/therapies: None   New lines/hardware: None  Labs to be followed outpatient: CBC  Exam to be followed outpatient: None    Discharge plan: discharge to Home    Physical Examination upon Discharge:  Constitutional: NAD  HEENT: NC/AT; PERRL, anicteric sclera; dry MM  Neck: supple, no JVD, +cervical neck collar   Cardiovascular: +S1/S2, RRR, +systolic murmur RUSB  Respiratory: CTA B/L, no W/R/R, looks comfortable on room air, without accessory muscle use or increased WOB  Gastrointestinal: abdomen soft, NT/ND; no rebound or guarding; +BSx4  Genitourinary: no suprapubic tenderness or fullness  Extremities: WWP; no LE edema; no clubbing or cyanosis  Vascular: 2+ radial, DP/PT and femoral pulses B/L  Dermatologic: normal color and turgor; no visible rashes

## 2022-03-27 NOTE — DISCHARGE NOTE PROVIDER - NSDCMRMEDTOKEN_GEN_ALL_CORE_FT
ferrous sulfate 325 mg (65 mg elemental iron) oral tablet: 1 tab(s) orally every other day   pantoprazole 40 mg oral delayed release tablet: 1 tab(s) orally once a day   rosuvastatin 10 mg oral tablet: 1 tab(s) orally once a day  tamsulosin 0.4 mg oral capsule: 1 cap(s) orally 2 times a day   ferrous sulfate 325 mg (65 mg elemental iron) oral tablet: 1 tab(s) orally every other day   pantoprazole 40 mg oral delayed release tablet: 1 tab(s) orally every 12 hours   pantoprazole 40 mg oral delayed release tablet: 1 tab(s) orally once a day   rosuvastatin 10 mg oral tablet: 1 tab(s) orally once a day  tamsulosin 0.4 mg oral capsule: 1 cap(s) orally 2 times a day   pantoprazole 40 mg oral delayed release tablet: 1 tab(s) orally every 12 hours   rosuvastatin 10 mg oral tablet: 1 tab(s) orally once a day  tamsulosin 0.4 mg oral capsule: 1 cap(s) orally 2 times a day

## 2022-03-27 NOTE — DISCHARGE NOTE PROVIDER - NSDCFUADDAPPT_GEN_ALL_CORE_FT
1. Please call [phone] to schedule an appointment with your Primary Care Physician (Dr. Mendez) within 1-2 weeks of discharge.

## 2022-03-27 NOTE — PROGRESS NOTE ADULT - SUBJECTIVE AND OBJECTIVE BOX
Pt seen and examined   no complaints  walked the hallway back and forth w/o dizziness  tolerating diet    REVIEW OF SYSTEMS:  Constitutional: No fever, weight loss or fatigue  Cardiovascular: No chest pain, palpitations, dizziness or leg swelling  Gastrointestinal: No abdominal or epigastric pain. No nausea, vomiting or hematemesis; No diarrhea or constipation. No melena or hematochezia.  Skin: No itching, burning, rashes or lesions       MEDICATIONS:  MEDICATIONS  (STANDING):  atorvastatin 40 milliGRAM(s) Oral at bedtime  dextrose 5%. 1000 milliLiter(s) (50 mL/Hr) IV Continuous <Continuous>  dextrose 5%. 1000 milliLiter(s) (100 mL/Hr) IV Continuous <Continuous>  dextrose 50% Injectable 25 Gram(s) IV Push once  dextrose 50% Injectable 12.5 Gram(s) IV Push once  dextrose 50% Injectable 25 Gram(s) IV Push once  glucagon  Injectable 1 milliGRAM(s) IntraMuscular once  insulin lispro (ADMELOG) corrective regimen sliding scale   SubCutaneous three times a day before meals  iron sucrose IVPB 200 milliGRAM(s) IV Intermittent every 24 hours  pantoprazole    Tablet 40 milliGRAM(s) Oral every 12 hours  tamsulosin 0.4 milliGRAM(s) Oral every 12 hours    MEDICATIONS  (PRN):  dextrose Oral Gel 15 Gram(s) Oral once PRN Blood Glucose LESS THAN 70 milliGRAM(s)/deciliter      Allergies    No Known Allergies    Intolerances        Vital Signs Last 24 Hrs  T(C): 37.1 (27 Mar 2022 14:41), Max: 37.1 (27 Mar 2022 14:41)  T(F): 98.7 (27 Mar 2022 14:41), Max: 98.7 (27 Mar 2022 14:41)  HR: 87 (27 Mar 2022 14:23) (67 - 90)  BP: 126/58 (27 Mar 2022 14:23) (108/52 - 136/78)  BP(mean): 83 (27 Mar 2022 14:23) (75 - 93)  RR: 18 (27 Mar 2022 14:23) (17 - 20)  SpO2: 97% (27 Mar 2022 14:23) (91% - 98%)    03-26 @ 07:01  -  03-27 @ 07:00  --------------------------------------------------------  IN: 520 mL / OUT: 1000 mL / NET: -480 mL    03-27 @ 07:01  -  03-27 @ 16:00  --------------------------------------------------------  IN: 0 mL / OUT: 731 mL / NET: -731 mL        PHYSICAL EXAM:    General:  in no acute distress  HEENT: MMM, conjunctiva and sclera clear  Gastrointestinal: Soft non-tender non-distended; Normal bowel sounds;   Skin: Warm and dry. No obvious rash    LABS:      CBC Full  -  ( 27 Mar 2022 14:53 )  WBC Count : 7.47 K/uL  RBC Count : 2.64 M/uL  Hemoglobin : 7.9 g/dL  Hematocrit : 23.9 %  Platelet Count - Automated : 297 K/uL  Mean Cell Volume : 90.5 fl  Mean Cell Hemoglobin : 29.9 pg  Mean Cell Hemoglobin Concentration : 33.1 gm/dL  Auto Neutrophil # : x  Auto Lymphocyte # : x  Auto Monocyte # : x  Auto Eosinophil # : x  Auto Basophil # : x  Auto Neutrophil % : x  Auto Lymphocyte % : x  Auto Monocyte % : x  Auto Eosinophil % : x  Auto Basophil % : x    03-27    142  |  110<H>  |  16  ----------------------------<  100<H>  3.8   |  24  |  0.91    Ca    8.3<L>      27 Mar 2022 14:53  Phos  2.7     03-27  Mg     1.9     03-27    TPro  4.8<L>  /  Alb  3.2<L>  /  TBili  0.2  /  DBili  x   /  AST  28  /  ALT  41  /  AlkPhos  59  03-27                      RADIOLOGY & ADDITIONAL STUDIES (The following images were personally reviewed):

## 2022-03-28 ENCOUNTER — TRANSCRIPTION ENCOUNTER (OUTPATIENT)
Age: 77
End: 2022-03-28

## 2022-03-28 VITALS — WEIGHT: 149.91 LBS

## 2022-03-28 LAB
ANION GAP SERPL CALC-SCNC: 8 MMOL/L — SIGNIFICANT CHANGE UP (ref 5–17)
BACTERIA UR CULT: NORMAL
BUN SERPL-MCNC: 17 MG/DL — SIGNIFICANT CHANGE UP (ref 7–23)
CALCIUM SERPL-MCNC: 8.6 MG/DL — SIGNIFICANT CHANGE UP (ref 8.4–10.5)
CHLORIDE SERPL-SCNC: 108 MMOL/L — SIGNIFICANT CHANGE UP (ref 96–108)
CO2 SERPL-SCNC: 25 MMOL/L — SIGNIFICANT CHANGE UP (ref 22–31)
CREAT SERPL-MCNC: 0.95 MG/DL — SIGNIFICANT CHANGE UP (ref 0.5–1.3)
EGFR: 83 ML/MIN/1.73M2 — SIGNIFICANT CHANGE UP
GLUCOSE SERPL-MCNC: 112 MG/DL — HIGH (ref 70–99)
HCT VFR BLD CALC: 26.6 % — LOW (ref 39–50)
HGB BLD-MCNC: 8.4 G/DL — LOW (ref 13–17)
MAGNESIUM SERPL-MCNC: 2.1 MG/DL — SIGNIFICANT CHANGE UP (ref 1.6–2.6)
MCHC RBC-ENTMCNC: 29.6 PG — SIGNIFICANT CHANGE UP (ref 27–34)
MCHC RBC-ENTMCNC: 31.6 GM/DL — LOW (ref 32–36)
MCV RBC AUTO: 93.7 FL — SIGNIFICANT CHANGE UP (ref 80–100)
NRBC # BLD: 0 /100 WBCS — SIGNIFICANT CHANGE UP (ref 0–0)
PHOSPHATE SERPL-MCNC: 2.9 MG/DL — SIGNIFICANT CHANGE UP (ref 2.5–4.5)
PLATELET # BLD AUTO: 324 K/UL — SIGNIFICANT CHANGE UP (ref 150–400)
POTASSIUM SERPL-MCNC: 3.8 MMOL/L — SIGNIFICANT CHANGE UP (ref 3.5–5.3)
POTASSIUM SERPL-SCNC: 3.8 MMOL/L — SIGNIFICANT CHANGE UP (ref 3.5–5.3)
RBC # BLD: 2.84 M/UL — LOW (ref 4.2–5.8)
RBC # FLD: 16.7 % — HIGH (ref 10.3–14.5)
SODIUM SERPL-SCNC: 141 MMOL/L — SIGNIFICANT CHANGE UP (ref 135–145)
SURGICAL PATHOLOGY STUDY: SIGNIFICANT CHANGE UP
WBC # BLD: 8.3 K/UL — SIGNIFICANT CHANGE UP (ref 3.8–10.5)
WBC # FLD AUTO: 8.3 K/UL — SIGNIFICANT CHANGE UP (ref 3.8–10.5)

## 2022-03-28 PROCEDURE — 88305 TISSUE EXAM BY PATHOLOGIST: CPT

## 2022-03-28 PROCEDURE — 86901 BLOOD TYPING SEROLOGIC RH(D): CPT

## 2022-03-28 PROCEDURE — 86923 COMPATIBILITY TEST ELECTRIC: CPT

## 2022-03-28 PROCEDURE — 86803 HEPATITIS C AB TEST: CPT

## 2022-03-28 PROCEDURE — 85045 AUTOMATED RETICULOCYTE COUNT: CPT

## 2022-03-28 PROCEDURE — 93005 ELECTROCARDIOGRAM TRACING: CPT

## 2022-03-28 PROCEDURE — 87635 SARS-COV-2 COVID-19 AMP PRB: CPT

## 2022-03-28 PROCEDURE — 36430 TRANSFUSION BLD/BLD COMPNT: CPT

## 2022-03-28 PROCEDURE — P9016: CPT

## 2022-03-28 PROCEDURE — 36415 COLL VENOUS BLD VENIPUNCTURE: CPT

## 2022-03-28 PROCEDURE — 86900 BLOOD TYPING SEROLOGIC ABO: CPT

## 2022-03-28 PROCEDURE — 84100 ASSAY OF PHOSPHORUS: CPT

## 2022-03-28 PROCEDURE — 83036 HEMOGLOBIN GLYCOSYLATED A1C: CPT

## 2022-03-28 PROCEDURE — 80053 COMPREHEN METABOLIC PANEL: CPT

## 2022-03-28 PROCEDURE — 99232 SBSQ HOSP IP/OBS MODERATE 35: CPT | Mod: GC

## 2022-03-28 PROCEDURE — 96375 TX/PRO/DX INJ NEW DRUG ADDON: CPT | Mod: XU

## 2022-03-28 PROCEDURE — 85025 COMPLETE CBC W/AUTO DIFF WBC: CPT

## 2022-03-28 PROCEDURE — 85730 THROMBOPLASTIN TIME PARTIAL: CPT

## 2022-03-28 PROCEDURE — 82962 GLUCOSE BLOOD TEST: CPT

## 2022-03-28 PROCEDURE — 96374 THER/PROPH/DIAG INJ IV PUSH: CPT

## 2022-03-28 PROCEDURE — 85610 PROTHROMBIN TIME: CPT

## 2022-03-28 PROCEDURE — 99231 SBSQ HOSP IP/OBS SF/LOW 25: CPT | Mod: GC

## 2022-03-28 PROCEDURE — 97161 PT EVAL LOW COMPLEX 20 MIN: CPT

## 2022-03-28 PROCEDURE — 85027 COMPLETE CBC AUTOMATED: CPT

## 2022-03-28 PROCEDURE — 83735 ASSAY OF MAGNESIUM: CPT

## 2022-03-28 PROCEDURE — 99291 CRITICAL CARE FIRST HOUR: CPT

## 2022-03-28 PROCEDURE — 80048 BASIC METABOLIC PNL TOTAL CA: CPT

## 2022-03-28 PROCEDURE — 86850 RBC ANTIBODY SCREEN: CPT

## 2022-03-28 RX ORDER — PANTOPRAZOLE SODIUM 20 MG/1
1 TABLET, DELAYED RELEASE ORAL
Qty: 60 | Refills: 0
Start: 2022-03-28 | End: 2022-04-26

## 2022-03-28 RX ORDER — IRON SUCROSE 20 MG/ML
200 INJECTION, SOLUTION INTRAVENOUS ONCE
Refills: 0 | Status: COMPLETED | OUTPATIENT
Start: 2022-03-28 | End: 2022-03-28

## 2022-03-28 RX ADMIN — IRON SUCROSE 110 MILLIGRAM(S): 20 INJECTION, SOLUTION INTRAVENOUS at 09:02

## 2022-03-28 RX ADMIN — TAMSULOSIN HYDROCHLORIDE 0.4 MILLIGRAM(S): 0.4 CAPSULE ORAL at 06:48

## 2022-03-28 RX ADMIN — PANTOPRAZOLE SODIUM 40 MILLIGRAM(S): 20 TABLET, DELAYED RELEASE ORAL at 06:48

## 2022-03-28 NOTE — DISCHARGE NOTE NURSING/CASE MANAGEMENT/SOCIAL WORK - PATIENT PORTAL LINK FT
You can access the FollowMyHealth Patient Portal offered by [facility] by registering at the following website: [email or web address]. By joining [employer]s FollowMyHealth portal, you will also be able to view your health information using other applications (apps) compatible with our system.

## 2022-03-28 NOTE — DISCHARGE NOTE NURSING/CASE MANAGEMENT/SOCIAL WORK - NSDCPEFALRISK_GEN_ALL_CORE
For information on Fall & Injury Prevention, visit: [email or web address] OR  [email or web address] OR  https://www.cdc.gov/steadi/patient.html

## 2022-03-28 NOTE — DIETITIAN INITIAL EVALUATION ADULT. - OTHER INFO
75 yo male with recent spinal stenosis surgery, HLD, urinary retention, who presents from PMD with chief complaint of dizziness. Reports that for ~5 days he has been having 1-2 dark black stools daily. Pt found to have new drop in hemoglobin, elevated BUN recent aspirin use, melanotic stools, admitted for concern for upper GI bleed; cause of bleed aspirin use vs H pylori. S/p EGD (3/25) with small hiatal hernia, gastritis, one duodenal ulcer white based, cratered 1 cm in diameter, no active signs of bleeding.     Pt seen alert in room this AM, RN present. Declined extensive RD visit/education this AM 2/2 reported Pending D/C. Consumed most of meal, ~%. Reports good PO PTA as well. Denies GI distress this AM, no NVDC. +BM 3/27. Pt seen d/t EMR BMI of 18.6 (<19), this is based on wt of 115 pounds - pt reports this wt is not correct and he has been wt stable at 150 pounds for some time no. Wt of 115 pounds does not appear accurate, thus BMI note not placed. NKFA. No issues chewing/swallowing. No pain. No edema or pressure ulcers, [place] 27.   Please see below for nutritions recommendations.

## 2022-03-28 NOTE — DISCHARGE NOTE NURSING/CASE MANAGEMENT/SOCIAL WORK - NSDCFUADDAPPT_GEN_ALL_CORE_FT
1. Please call [phone] to schedule an appointment with your Primary Care Physician (Dr. Mendez) within 1-2 weeks of discharge.

## 2022-03-28 NOTE — PROGRESS NOTE ADULT - PROBLEM SELECTOR PLAN 1
Patient presents with 5 days of black stools, dizziness, and recent hemoglobin drop from 13-->7.1 in ~2 weeks time, elevated BUN, guaiac positive in outpatient doctor, likely secondary to GI bleed. DDx for cause of bleed aspirin use vs H pylori. No NSAID use, alcohol, liver dysfunction, herbs/supplement use, abnormal cscope. S/p 2u pRBC total this admission and 1L NS  - GI consulted, Dr. Lindsey performed EGD (3/25) = small hiatal hernia, gastritis, one duodenal ulcer white based, cratered 1 cm in diameter, no active signs of bleeding   - c/w PO PPI BID  - c/w regular diet  - c/w IV iron (3/27-3/28)  - AM Hgb stable

## 2022-03-28 NOTE — DIETITIAN INITIAL EVALUATION ADULT. - PROBLEM SELECTOR PLAN 4
Known hx of spinal stenosis s/p recent c spine surgery 2-3 wk ago [facility]  - Surescripts had Tramadol ordered, but pt was not taking given concern for urinary retention.   - Would avoid NSAIDS given duodenal ulcer on EGD  - f/u outpatient

## 2022-03-28 NOTE — PROGRESS NOTE ADULT - PROBLEM SELECTOR PLAN 4
Known hx of spinal stenosis s/p recent c spine surgery 2-3 wk ago at [facility]  - Surescripts had Tramadol ordered, but pt was not taking given concern for urinary retention.   - Would avoid NSAIDS given duodenal ulcer on EGD  - f/u outpatient
Known hx of spinal stenosis s/p recent c spine surgery 2-3 wk ago at [facility]  - Surescripts had Tramadol ordered, but pt was not taking given concern for urinary retention.   - Would avoid NSAIDS given duodenal ulcer on EGD  - f/u outpatient
Known hx of spinal stenosis s/p recent [facility]  - Surescripts had Tramadol ordered, but pt was not taking given concern for urinary retention.   - Would avoid NSAIDS given duodenal ulcer on EGD  - f/u outpatient

## 2022-03-28 NOTE — PROGRESS NOTE ADULT - PROBLEM SELECTOR PLAN 3
On home crestor 10mg  -continue atorvastatin 40mg

## 2022-03-28 NOTE — DIETITIAN INITIAL EVALUATION ADULT. - PROBLEM SELECTOR PLAN 2
Patient recently had urinary retention requiring indwelling alamzan, removed yesterday 3/24. Voiding freely  -continue flomax 0.4mg BID  -Monitor UOP  -Obtain bladder scan if concerns for retention

## 2022-03-28 NOTE — PROGRESS NOTE ADULT - PROBLEM SELECTOR PLAN 2
Patient recently had urinary retention requiring indwelling almazan, removed yesterday 3/24. Voiding freely  -continue flomax 0.4mg BID  -Documented voiding of 150cc overnight, but patient states he has been voiding more regularly every 3-4 hours  -Monitor for UOP  -Obtain bladder scan if concerns for retention
Patient recently had urinary retention requiring indwelling almazan, removed yesterday 3/24. Voiding freely  -continue flomax 0.4mg BID  -Monitor UOP  -Obtain bladder scan if concerns for retention
Patient recently had urinary retention requiring indwelling almazan, removed yesterday 3/24. Voiding freely  -continue flomax 0.4mg BID  -Documented voiding of 150cc overnight, but patient states he has been voiding more regularly every 3-4 hours  -Monitor for UOP  -Obtain bladder scan if concerns for retention

## 2022-03-28 NOTE — PROGRESS NOTE ADULT - PROBLEM SELECTOR PLAN 5
F: s/p 1L NS, 2U pRBC  E: replete K<4, Mg<2  N: regular  VTE Prophylaxis: SCD, hold AC i/s/o GIB  GI: protonix  C: Full Code  D: 5Lach
F: s/p 1L NS, 1 unit pRBC  E: replete K<4, Mg<2  N: regular  VTE Prophylaxis: SCD, hold AC i/s/o GIB  GI: protonix  C: Full Code  D: 5Lach
F: s/p 1L NS, 2U pRBC  E: replete K<4, Mg<2  N: regular  VTE Prophylaxis: SCD, hold AC i/s/o GIB  GI: protonix  C: Full Code  D: 5Lach

## 2022-03-28 NOTE — DIETITIAN INITIAL EVALUATION ADULT. - PROBLEM SELECTOR PLAN 5
F: s/p 1L NS, 1 unit pRBC  E: replete K<4, Mg<2  N: regular  VTE Prophylaxis: SCD, hold AC i/s/o GIB  GI: protonix  C: Full Code  D: 5Lach

## 2022-03-28 NOTE — PROGRESS NOTE ADULT - PROVIDER SPECIALTY LIST ADULT
Gastroenterology
Internal Medicine
Gastroenterology
Internal Medicine
Internal Medicine
Gastroenterology

## 2022-03-28 NOTE — PROGRESS NOTE ADULT - SUBJECTIVE AND OBJECTIVE BOX
Pt seen and examined   no complaints  out of bed sitting  Hgb 8.4    REVIEW OF SYSTEMS:  Constitutional: No fever, weight loss or fatigue  Cardiovascular: No chest pain, palpitations, dizziness or leg swelling  Gastrointestinal: No abdominal or epigastric pain. No nausea, vomiting or hematemesis; No diarrhea or constipation. No melena or hematochezia.  Skin: No itching, burning, rashes or lesions       MEDICATIONS:  MEDICATIONS  (STANDING):  atorvastatin 40 milliGRAM(s) Oral at bedtime  dextrose 5%. 1000 milliLiter(s) (50 mL/Hr) IV Continuous <Continuous>  dextrose 5%. 1000 milliLiter(s) (100 mL/Hr) IV Continuous <Continuous>  dextrose 50% Injectable 25 Gram(s) IV Push once  dextrose 50% Injectable 12.5 Gram(s) IV Push once  dextrose 50% Injectable 25 Gram(s) IV Push once  glucagon  Injectable 1 milliGRAM(s) IntraMuscular once  pantoprazole    Tablet 40 milliGRAM(s) Oral every 12 hours  tamsulosin 0.4 milliGRAM(s) Oral every 12 hours    MEDICATIONS  (PRN):  dextrose Oral Gel 15 Gram(s) Oral once PRN Blood Glucose LESS THAN 70 milliGRAM(s)/deciliter      Allergies    No Known Allergies    Intolerances        Vital Signs Last 24 Hrs  T(C): 36.1 (28 Mar 2022 09:45), Max: 37.1 (27 Mar 2022 14:41)  T(F): 97 (28 Mar 2022 09:45), Max: 98.7 (27 Mar 2022 14:41)  HR: 103 (28 Mar 2022 08:58) (74 - 103)  BP: 129/59 (28 Mar 2022 08:58) (107/54 - 129/59)  BP(mean): 85 (28 Mar 2022 08:58) (72 - 85)  RR: 20 (28 Mar 2022 08:58) (18 - 20)  SpO2: 97% (28 Mar 2022 08:58) (96% - 100%)    03-27 @ 07:01  -  03-28 @ 07:00  --------------------------------------------------------  IN: 0 mL / OUT: 731 mL / NET: -731 mL    03-28 @ 07:01  -  03-28 @ 10:01  --------------------------------------------------------  IN: 100 mL / OUT: 0 mL / NET: 100 mL        PHYSICAL EXAM:    General:  in no acute distress  HEENT: MMM, conjunctiva and sclera clear  Gastrointestinal: Soft non-tender non-distended; Normal bowel sounds;  Skin: Warm and dry. No obvious rash    LABS:      CBC Full  -  ( 28 Mar 2022 08:07 )  WBC Count : 8.30 K/uL  RBC Count : 2.84 M/uL  Hemoglobin : 8.4 g/dL  Hematocrit : 26.6 %  Platelet Count - Automated : 324 K/uL  Mean Cell Volume : 93.7 fl  Mean Cell Hemoglobin : 29.6 pg  Mean Cell Hemoglobin Concentration : 31.6 gm/dL  Auto Neutrophil # : x  Auto Lymphocyte # : x  Auto Monocyte # : x  Auto Eosinophil # : x  Auto Basophil # : x  Auto Neutrophil % : x  Auto Lymphocyte % : x  Auto Monocyte % : x  Auto Eosinophil % : x  Auto Basophil % : x    03-28    141  |  108  |  17  ----------------------------<  112<H>  3.8   |  25  |  0.95    Ca    8.6      28 Mar 2022 08:07  Phos  2.9     03-28  Mg     2.1     03-28    TPro  4.8<L>  /  Alb  3.2<L>  /  TBili  0.2  /  DBili  x   /  AST  28  /  ALT  41  /  AlkPhos  59  03-27                      RADIOLOGY & ADDITIONAL STUDIES (The following images were personally reviewed):

## 2022-03-28 NOTE — DIETITIAN INITIAL EVALUATION ADULT. - OTHER CALCULATIONS
%YOL=010.6; current body wt used for energy calculations as pt falls within % IBW; adjusted for age and current conditions

## 2022-03-28 NOTE — PROGRESS NOTE ADULT - ASSESSMENT
Hgb 7.0  mot sure if he is truly bleeding because endoscopy showed no bleed and he is no longer dizzy and walked to bathroom  Recommend: repeat CBC, iron infusion  regular diet, po PPI  if repeat Hgb okay I think he can go home on BID PPI x 1 month
acute post hemorrhagic anemia  NSAIDs gatropathy  UGI bleed  NPO   IV PPI  egd  monitor H/H   type and screen
discharge home with ppi BID x 1 month then qday
received 1 unit yesterday and IV iron  Hgb 7.9  diet as tolerated  can go on oral ppi
77 yo male with recent spinal stenosis surgery, HLD, urinary retention, who presents with dizziness, found to have new drop in hemoglobin, elevated BUN recent aspirin use, melanotic stools, admitted for concern for upper GI bleed.
75 yo male with recent spinal stenosis surgery, HLD, urinary retention, who presents with dizziness, found to have new drop in hemoglobin, elevated BUN recent aspirin use, melanotic stools, admitted for concern for upper GI bleed.
77 yo male with recent spinal stenosis surgery, HLD, urinary retention, who presents with dizziness, found to have new drop in hemoglobin, elevated BUN recent aspirin use, melanotic stools, admitted for concern for upper GI bleed.

## 2022-03-28 NOTE — PROGRESS NOTE ADULT - SUBJECTIVE AND OBJECTIVE BOX
** INCOMPLETE **    OVERNIGHT EVENTS:     SUBJECTIVE:    VITAL SIGNS:  Vital Signs Last 24 Hrs  T(C): 36.9 (28 Mar 2022 05:19), Max: 37.1 (27 Mar 2022 14:41)  T(F): 98.5 (28 Mar 2022 05:19), Max: 98.7 (27 Mar 2022 14:41)  HR: 74 (28 Mar 2022 04:43) (74 - 90)  BP: 113/56 (28 Mar 2022 04:43) (107/54 - 136/78)  BP(mean): 80 (28 Mar 2022 04:43) (72 - 93)  RR: 18 (28 Mar 2022 04:43) (18 - 19)  SpO2: 96% (28 Mar 2022 04:43) (96% - 100%)    PHYSICAL EXAM:  General: NAD; speaking in full sentences  HEENT: NC/AT; PERRL; EOMI; MMM  Neck: supple; no JVD  Cardiac: RRR; +S1/S2  Pulm: CTA B/L; no W/R/R  GI: soft, NT/ND, +BS  Extremities: WWP; no edema, clubbing or cyanosis  Vasc: 2+ radial, DP pulses B/L  Neuro: AAOx3; no focal deficits    MEDICATIONS:  MEDICATIONS  (STANDING):  atorvastatin 40 milliGRAM(s) Oral at bedtime  dextrose 5%. 1000 milliLiter(s) (50 mL/Hr) IV Continuous <Continuous>  dextrose 5%. 1000 milliLiter(s) (100 mL/Hr) IV Continuous <Continuous>  dextrose 50% Injectable 25 Gram(s) IV Push once  dextrose 50% Injectable 12.5 Gram(s) IV Push once  dextrose 50% Injectable 25 Gram(s) IV Push once  glucagon  Injectable 1 milliGRAM(s) IntraMuscular once  iron sucrose IVPB 200 milliGRAM(s) IV Intermittent every 24 hours  pantoprazole    Tablet 40 milliGRAM(s) Oral every 12 hours  tamsulosin 0.4 milliGRAM(s) Oral every 12 hours    MEDICATIONS  (PRN):  dextrose Oral Gel 15 Gram(s) Oral once PRN Blood Glucose LESS THAN 70 milliGRAM(s)/deciliter      ALLERGIES:  Allergies    No Known Allergies    Intolerances        LABS:                        7.9    7.47  )-----------( 297      ( 27 Mar 2022 14:53 )             23.9     03-27    142  |  110<H>  |  16  ----------------------------<  100<H>  3.8   |  24  |  0.91    Ca    8.3<L>      27 Mar 2022 14:53  Phos  2.7     03-27  Mg     1.9     03-27    TPro  4.8<L>  /  Alb  3.2<L>  /  TBili  0.2  /  DBili  x   /  AST  28  /  ALT  41  /  AlkPhos  59  03-27        RADIOLOGY & ADDITIONAL TESTS: Reviewed. OVERNIGHT EVENTS:   No acute events overnight. No bowel movements overnight    SUBJECTIVE:  Patient seen and examined at bedside, resting comfortably in bed, and does not appear to be in any acute distress. Patient states he feels much better today. Denies any new complaints.  Patient denies any recent fevers, chills, nausea, vomiting, headache, acute sob, chest pain, abdominal pain, genitourinary sx, extremity pain or swelling.     VITAL SIGNS:  Vital Signs Last 24 Hrs  T(C): 36.9 (28 Mar 2022 05:19), Max: 37.1 (27 Mar 2022 14:41)  T(F): 98.5 (28 Mar 2022 05:19), Max: 98.7 (27 Mar 2022 14:41)  HR: 74 (28 Mar 2022 04:43) (74 - 90)  BP: 113/56 (28 Mar 2022 04:43) (107/54 - 136/78)  BP(mean): 80 (28 Mar 2022 04:43) (72 - 93)  RR: 18 (28 Mar 2022 04:43) (18 - 19)  SpO2: 96% (28 Mar 2022 04:43) (96% - 100%)    PHYSICAL EXAM:  Constitutional: NAD  HEENT: NC/AT; PERRL, anicteric sclera; dry MM  Neck: supple, no JVD, +cervical neck collar   Cardiovascular: +S1/S2, RRR, +systolic murmur RUSB  Respiratory: CTA B/L, no W/R/R, looks comfortable on room air, without accessory muscle use or increased WOB  Gastrointestinal: abdomen soft, NT/ND; no rebound or guarding; +BSx4  Genitourinary: no suprapubic tenderness or fullness  Extremities: WWP; no LE edema; no clubbing or cyanosis  Vascular: 2+ radial, DP/PT and femoral pulses B/L  Dermatologic: normal color and turgor; no visible rashes    MEDICATIONS:  MEDICATIONS  (STANDING):  atorvastatin 40 milliGRAM(s) Oral at bedtime  dextrose 5%. 1000 milliLiter(s) (50 mL/Hr) IV Continuous <Continuous>  dextrose 5%. 1000 milliLiter(s) (100 mL/Hr) IV Continuous <Continuous>  dextrose 50% Injectable 25 Gram(s) IV Push once  dextrose 50% Injectable 12.5 Gram(s) IV Push once  dextrose 50% Injectable 25 Gram(s) IV Push once  glucagon  Injectable 1 milliGRAM(s) IntraMuscular once  iron sucrose IVPB 200 milliGRAM(s) IV Intermittent every 24 hours  pantoprazole    Tablet 40 milliGRAM(s) Oral every 12 hours  tamsulosin 0.4 milliGRAM(s) Oral every 12 hours    MEDICATIONS  (PRN):  dextrose Oral Gel 15 Gram(s) Oral once PRN Blood Glucose LESS THAN 70 milliGRAM(s)/deciliter      ALLERGIES:  Allergies    No Known Allergies    Intolerances        LABS:                        7.9    7.47  )-----------( 297      ( 27 Mar 2022 14:53 )             23.9     03-27    142  |  110<H>  |  16  ----------------------------<  100<H>  3.8   |  24  |  0.91    Ca    8.3<L>      27 Mar 2022 14:53  Phos  2.7     03-27  Mg     1.9     03-27    TPro  4.8<L>  /  Alb  3.2<L>  /  TBili  0.2  /  DBili  x   /  AST  28  /  ALT  41  /  AlkPhos  59  03-27        RADIOLOGY & ADDITIONAL TESTS: Reviewed.

## 2022-03-28 NOTE — PROGRESS NOTE ADULT - ATTENDING COMMENTS
acute blood loss anemia from UGI bleed with duodenal ulcer  physical as above  H/H still labile but hemodynamically stable  follow H/H  Fe infusion  continue protonix  No evidence of severe bleeding  Hold ASA  decision making of high complexity
Hemoglobin and VS stable.
acute blood loss anemia with duodenal hemorrhage from ulcer on ASA after cervical surgery  physical as above  concurrent glucose with high FS was 100  continue IV Fe  H/H is stable  discussed with Dr. Lindsey and Dr. Mendez  expect discharge tomorrow

## 2022-04-06 DIAGNOSIS — K26.9 DUODENAL ULCER, UNSPECIFIED AS ACUTE OR CHRONIC, WITHOUT HEMORRHAGE OR PERFORATION: ICD-10-CM

## 2022-04-06 DIAGNOSIS — M48.00 SPINAL STENOSIS, SITE UNSPECIFIED: ICD-10-CM

## 2022-04-06 DIAGNOSIS — Z79.82 LONG TERM (CURRENT) USE OF ASPIRIN: ICD-10-CM

## 2022-04-06 DIAGNOSIS — K92.2 GASTROINTESTINAL HEMORRHAGE, UNSPECIFIED: ICD-10-CM

## 2022-04-06 DIAGNOSIS — K31.9 DISEASE OF STOMACH AND DUODENUM, UNSPECIFIED: ICD-10-CM

## 2022-04-06 DIAGNOSIS — K29.70 GASTRITIS, UNSPECIFIED, WITHOUT BLEEDING: ICD-10-CM

## 2022-04-06 DIAGNOSIS — R33.9 RETENTION OF URINE, UNSPECIFIED: ICD-10-CM

## 2022-04-06 DIAGNOSIS — K44.9 DIAPHRAGMATIC HERNIA WITHOUT OBSTRUCTION OR GANGRENE: ICD-10-CM

## 2022-04-06 DIAGNOSIS — D62 ACUTE POSTHEMORRHAGIC ANEMIA: ICD-10-CM

## 2022-04-06 DIAGNOSIS — E78.5 HYPERLIPIDEMIA, UNSPECIFIED: ICD-10-CM

## 2022-04-15 PROBLEM — E78.5 HYPERLIPIDEMIA, UNSPECIFIED: Chronic | Status: ACTIVE | Noted: 2022-03-25

## 2022-04-19 ENCOUNTER — OUTPATIENT (OUTPATIENT)
Dept: OUTPATIENT SERVICES | Facility: HOSPITAL | Age: 77
LOS: 1 days | Discharge: HOME | End: 2022-04-19
Payer: MEDICARE

## 2022-04-19 DIAGNOSIS — Z98.890 OTHER SPECIFIED POSTPROCEDURAL STATES: Chronic | ICD-10-CM

## 2022-04-19 PROCEDURE — 43235 EGD DIAGNOSTIC BRUSH WASH: CPT

## 2023-03-30 ENCOUNTER — NON-APPOINTMENT (OUTPATIENT)
Age: 78
End: 2023-03-30

## 2023-04-18 NOTE — ED PROVIDER NOTE - TEMPLATE, MLM
Change levothyroxine to 150 mcg daily to make it easier.    Continue to get thyroid abs for monitoring as ordered by PCP. If abnormal, please let us know.    Follow-up: once a year or can resume monitoring with PCP and return to clinic as needed    Reviewed red flags that warrant immediate evaluation by me (ie, up-trending thyroglobulin level or anterior neck symptoms)   VIEW ALL

## 2023-09-11 RX ORDER — TAMSULOSIN HYDROCHLORIDE 0.4 MG/1
0.4 CAPSULE ORAL
Qty: 180 | Refills: 0 | Status: ACTIVE | COMMUNITY
Start: 2023-09-11 | End: 1900-01-01

## 2024-09-12 NOTE — PATIENT PROFILE ADULT - MEDICATIONS/VISITS
"Pt arrived for his Chemotherapy treatment.. Offers no new complaints/concerns other then the \"usual\" which is fatigue, nausea on and off and loss of appetite but states eating as directed by dietician and is taking his ordered home medications.    All medications fully infused via patent PORT without any incident or reactions. Pt tolerated same well.    Declined AVS and states being aware to return tomorrow at 08:00AM.    Pt was discharged in stable condition.    "
no
